# Patient Record
Sex: FEMALE | Race: WHITE | NOT HISPANIC OR LATINO | Employment: FULL TIME | URBAN - NONMETROPOLITAN AREA
[De-identification: names, ages, dates, MRNs, and addresses within clinical notes are randomized per-mention and may not be internally consistent; named-entity substitution may affect disease eponyms.]

---
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
## 2020-03-16 ENCOUNTER — HOSPITAL ENCOUNTER (OUTPATIENT)
Dept: GENERAL RADIOLOGY | Facility: HOSPITAL | Age: 20
Discharge: HOME | End: 2020-03-16
Admitting: NURSE PRACTITIONER

## 2020-03-16 ENCOUNTER — TRANSCRIBE ORDERS (OUTPATIENT)
Dept: GENERAL RADIOLOGY | Facility: HOSPITAL | Age: 20
End: 2020-03-16

## 2020-03-16 DIAGNOSIS — M25.561 RIGHT KNEE PAIN, UNSPECIFIED CHRONICITY: Primary | ICD-10-CM

## 2020-03-16 DIAGNOSIS — M25.561 RIGHT KNEE PAIN, UNSPECIFIED CHRONICITY: ICD-10-CM

## 2020-03-16 PROCEDURE — 73562 X-RAY EXAM OF KNEE 3: CPT

## 2020-07-14 ENCOUNTER — TRANSCRIBE ORDERS (OUTPATIENT)
Dept: ADMINISTRATIVE | Facility: HOSPITAL | Age: 20
End: 2020-07-14

## 2020-07-14 DIAGNOSIS — R10.11 ABDOMINAL PAIN, RIGHT UPPER QUADRANT: Primary | ICD-10-CM

## 2020-07-15 ENCOUNTER — HOSPITAL ENCOUNTER (OUTPATIENT)
Dept: ULTRASOUND IMAGING | Facility: HOSPITAL | Age: 20
Discharge: HOME | End: 2020-07-15
Admitting: NURSE PRACTITIONER

## 2020-07-15 DIAGNOSIS — R10.11 ABDOMINAL PAIN, RIGHT UPPER QUADRANT: ICD-10-CM

## 2020-07-15 PROCEDURE — 76705 ECHO EXAM OF ABDOMEN: CPT

## 2020-07-17 ENCOUNTER — TRANSCRIBE ORDERS (OUTPATIENT)
Dept: ADMINISTRATIVE | Facility: HOSPITAL | Age: 20
End: 2020-07-17

## 2020-07-17 DIAGNOSIS — R10.9 ABDOMINAL PAIN, UNSPECIFIED ABDOMINAL LOCATION: Primary | ICD-10-CM

## 2020-07-21 ENCOUNTER — APPOINTMENT (OUTPATIENT)
Dept: NUCLEAR MEDICINE | Facility: HOSPITAL | Age: 20
End: 2020-07-21

## 2020-07-22 ENCOUNTER — HOSPITAL ENCOUNTER (OUTPATIENT)
Dept: NUCLEAR MEDICINE | Facility: HOSPITAL | Age: 20
Discharge: HOME | End: 2020-07-22

## 2020-07-22 DIAGNOSIS — R10.9 ABDOMINAL PAIN, UNSPECIFIED ABDOMINAL LOCATION: ICD-10-CM

## 2020-07-22 PROCEDURE — A9537 TC99M MEBROFENIN: HCPCS | Performed by: NURSE PRACTITIONER

## 2020-07-22 PROCEDURE — 78226 HEPATOBILIARY SYSTEM IMAGING: CPT

## 2020-07-22 PROCEDURE — 0 TECHNETIUM TC 99M MEBROFENIN KIT: Performed by: NURSE PRACTITIONER

## 2020-07-22 RX ORDER — KIT FOR THE PREPARATION OF TECHNETIUM TC 99M MEBROFENIN 45 MG/10ML
1 INJECTION, POWDER, LYOPHILIZED, FOR SOLUTION INTRAVENOUS
Status: COMPLETED | OUTPATIENT
Start: 2020-07-22 | End: 2020-07-22

## 2020-07-22 RX ADMIN — MEBROFENIN 1 DOSE: 45 INJECTION, POWDER, LYOPHILIZED, FOR SOLUTION INTRAVENOUS at 08:08

## 2020-07-28 ENCOUNTER — TRANSCRIBE ORDERS (OUTPATIENT)
Dept: ADMINISTRATIVE | Facility: HOSPITAL | Age: 20
End: 2020-07-28

## 2020-07-28 DIAGNOSIS — Z01.818 PRE-OP TESTING: Primary | ICD-10-CM

## 2020-07-30 ENCOUNTER — APPOINTMENT (OUTPATIENT)
Dept: PREADMISSION TESTING | Facility: HOSPITAL | Age: 20
End: 2020-07-30

## 2020-07-30 VITALS
HEIGHT: 68 IN | BODY MASS INDEX: 33.88 KG/M2 | DIASTOLIC BLOOD PRESSURE: 77 MMHG | WEIGHT: 223.55 LBS | HEART RATE: 105 BPM | OXYGEN SATURATION: 99 % | SYSTOLIC BLOOD PRESSURE: 135 MMHG

## 2020-07-30 LAB
ALBUMIN SERPL-MCNC: 4.5 G/DL (ref 3.5–5.2)
ALBUMIN/GLOB SERPL: 1.7 G/DL
ALP SERPL-CCNC: 46 U/L (ref 39–117)
ALT SERPL W P-5'-P-CCNC: 12 U/L (ref 1–33)
AMYLASE SERPL-CCNC: 62 U/L (ref 28–100)
ANION GAP SERPL CALCULATED.3IONS-SCNC: 13 MMOL/L (ref 5–15)
AST SERPL-CCNC: 20 U/L (ref 1–32)
BASOPHILS # BLD AUTO: 0.02 10*3/MM3 (ref 0–0.2)
BASOPHILS NFR BLD AUTO: 0.3 % (ref 0–1.5)
BILIRUB SERPL-MCNC: 0.2 MG/DL (ref 0–1.2)
BUN SERPL-MCNC: 10 MG/DL (ref 6–20)
BUN/CREAT SERPL: 17.9 (ref 7–25)
CALCIUM SPEC-SCNC: 9.7 MG/DL (ref 8.6–10.5)
CHLORIDE SERPL-SCNC: 99 MMOL/L (ref 98–107)
CO2 SERPL-SCNC: 26 MMOL/L (ref 22–29)
CREAT SERPL-MCNC: 0.56 MG/DL (ref 0.57–1)
DEPRECATED RDW RBC AUTO: 36.1 FL (ref 37–54)
EOSINOPHIL # BLD AUTO: 0.05 10*3/MM3 (ref 0–0.4)
EOSINOPHIL NFR BLD AUTO: 0.6 % (ref 0.3–6.2)
ERYTHROCYTE [DISTWIDTH] IN BLOOD BY AUTOMATED COUNT: 12 % (ref 12.3–15.4)
GFR SERPL CREATININE-BSD FRML MDRD: 137 ML/MIN/1.73
GLOBULIN UR ELPH-MCNC: 2.7 GM/DL
GLUCOSE SERPL-MCNC: 114 MG/DL (ref 65–99)
HCT VFR BLD AUTO: 39.5 % (ref 34–46.6)
HGB BLD-MCNC: 13.8 G/DL (ref 12–15.9)
IMM GRANULOCYTES # BLD AUTO: 0.01 10*3/MM3 (ref 0–0.05)
IMM GRANULOCYTES NFR BLD AUTO: 0.1 % (ref 0–0.5)
LIPASE SERPL-CCNC: 34 U/L (ref 13–60)
LYMPHOCYTES # BLD AUTO: 3.13 10*3/MM3 (ref 0.7–3.1)
LYMPHOCYTES NFR BLD AUTO: 39.6 % (ref 19.6–45.3)
MCH RBC QN AUTO: 28.8 PG (ref 26.6–33)
MCHC RBC AUTO-ENTMCNC: 34.9 G/DL (ref 31.5–35.7)
MCV RBC AUTO: 82.3 FL (ref 79–97)
MONOCYTES # BLD AUTO: 0.57 10*3/MM3 (ref 0.1–0.9)
MONOCYTES NFR BLD AUTO: 7.2 % (ref 5–12)
NEUTROPHILS NFR BLD AUTO: 4.12 10*3/MM3 (ref 1.7–7)
NEUTROPHILS NFR BLD AUTO: 52.2 % (ref 42.7–76)
NRBC BLD AUTO-RTO: 0 /100 WBC (ref 0–0.2)
PLATELET # BLD AUTO: 309 10*3/MM3 (ref 140–450)
PMV BLD AUTO: 11.1 FL (ref 6–12)
POTASSIUM SERPL-SCNC: 3.5 MMOL/L (ref 3.5–5.2)
PROT SERPL-MCNC: 7.2 G/DL (ref 6–8.5)
RBC # BLD AUTO: 4.8 10*6/MM3 (ref 3.77–5.28)
SODIUM SERPL-SCNC: 138 MMOL/L (ref 136–145)
WBC # BLD AUTO: 7.9 10*3/MM3 (ref 3.4–10.8)

## 2020-07-30 PROCEDURE — 36415 COLL VENOUS BLD VENIPUNCTURE: CPT

## 2020-07-30 PROCEDURE — 83690 ASSAY OF LIPASE: CPT | Performed by: SPECIALIST

## 2020-07-30 PROCEDURE — 82150 ASSAY OF AMYLASE: CPT | Performed by: SPECIALIST

## 2020-07-30 PROCEDURE — 85025 COMPLETE CBC W/AUTO DIFF WBC: CPT | Performed by: SPECIALIST

## 2020-07-30 PROCEDURE — 80053 COMPREHEN METABOLIC PANEL: CPT | Performed by: SPECIALIST

## 2020-07-30 PROCEDURE — 93005 ELECTROCARDIOGRAM TRACING: CPT

## 2020-07-30 PROCEDURE — 93010 ELECTROCARDIOGRAM REPORT: CPT | Performed by: INTERNAL MEDICINE

## 2020-07-30 RX ORDER — LISINOPRIL AND HYDROCHLOROTHIAZIDE 20; 12.5 MG/1; MG/1
1 TABLET ORAL DAILY
COMMUNITY
End: 2022-10-17

## 2020-07-31 ENCOUNTER — LAB (OUTPATIENT)
Dept: LAB | Facility: HOSPITAL | Age: 20
End: 2020-07-31

## 2020-07-31 PROCEDURE — C9803 HOPD COVID-19 SPEC COLLECT: HCPCS | Performed by: SPECIALIST

## 2020-07-31 PROCEDURE — U0003 INFECTIOUS AGENT DETECTION BY NUCLEIC ACID (DNA OR RNA); SEVERE ACUTE RESPIRATORY SYNDROME CORONAVIRUS 2 (SARS-COV-2) (CORONAVIRUS DISEASE [COVID-19]), AMPLIFIED PROBE TECHNIQUE, MAKING USE OF HIGH THROUGHPUT TECHNOLOGIES AS DESCRIBED BY CMS-2020-01-R: HCPCS | Performed by: SPECIALIST

## 2020-08-01 LAB
COVID LABCORP PRIORITY: NORMAL
SARS-COV-2 RNA RESP QL NAA+PROBE: NOT DETECTED

## 2020-08-03 ENCOUNTER — ANESTHESIA EVENT (OUTPATIENT)
Dept: PERIOP | Facility: HOSPITAL | Age: 20
End: 2020-08-03

## 2020-08-03 ENCOUNTER — ANESTHESIA (OUTPATIENT)
Dept: PERIOP | Facility: HOSPITAL | Age: 20
End: 2020-08-03

## 2020-08-03 ENCOUNTER — HOSPITAL ENCOUNTER (OUTPATIENT)
Facility: HOSPITAL | Age: 20
Setting detail: HOSPITAL OUTPATIENT SURGERY
Discharge: HOME | End: 2020-08-03
Attending: SPECIALIST | Admitting: SPECIALIST

## 2020-08-03 VITALS
HEART RATE: 97 BPM | OXYGEN SATURATION: 98 % | RESPIRATION RATE: 18 BRPM | TEMPERATURE: 97.8 F | DIASTOLIC BLOOD PRESSURE: 91 MMHG | SYSTOLIC BLOOD PRESSURE: 134 MMHG

## 2020-08-03 DIAGNOSIS — K81.1 CHOLECYSTITIS, CHRONIC: ICD-10-CM

## 2020-08-03 LAB — B-HCG UR QL: NEGATIVE

## 2020-08-03 PROCEDURE — 81025 URINE PREGNANCY TEST: CPT | Performed by: SPECIALIST

## 2020-08-03 PROCEDURE — 25010000002 ONDANSETRON PER 1 MG: Performed by: NURSE ANESTHETIST, CERTIFIED REGISTERED

## 2020-08-03 PROCEDURE — 25010000002 SUCCINYLCHOLINE PER 20 MG: Performed by: NURSE ANESTHETIST, CERTIFIED REGISTERED

## 2020-08-03 PROCEDURE — 25010000002 FENTANYL CITRATE (PF) 100 MCG/2ML SOLUTION: Performed by: ANESTHESIOLOGY

## 2020-08-03 PROCEDURE — 25010000002 DEXAMETHASONE PER 1 MG: Performed by: ANESTHESIOLOGY

## 2020-08-03 PROCEDURE — 25010000002 MIDAZOLAM PER 1 MG: Performed by: ANESTHESIOLOGY

## 2020-08-03 PROCEDURE — 88304 TISSUE EXAM BY PATHOLOGIST: CPT | Performed by: SPECIALIST

## 2020-08-03 PROCEDURE — 25010000002 DEXAMETHASONE PER 1 MG: Performed by: NURSE ANESTHETIST, CERTIFIED REGISTERED

## 2020-08-03 PROCEDURE — 25010000002 PROPOFOL 10 MG/ML EMULSION: Performed by: NURSE ANESTHETIST, CERTIFIED REGISTERED

## 2020-08-03 RX ORDER — SODIUM CHLORIDE 0.9 % (FLUSH) 0.9 %
3-10 SYRINGE (ML) INJECTION AS NEEDED
Status: DISCONTINUED | OUTPATIENT
Start: 2020-08-03 | End: 2020-08-03 | Stop reason: HOSPADM

## 2020-08-03 RX ORDER — LIDOCAINE HYDROCHLORIDE 10 MG/ML
0.5 INJECTION, SOLUTION EPIDURAL; INFILTRATION; INTRACAUDAL; PERINEURAL ONCE AS NEEDED
Status: DISCONTINUED | OUTPATIENT
Start: 2020-08-03 | End: 2020-08-03 | Stop reason: HOSPADM

## 2020-08-03 RX ORDER — FLUMAZENIL 0.1 MG/ML
0.2 INJECTION INTRAVENOUS AS NEEDED
Status: DISCONTINUED | OUTPATIENT
Start: 2020-08-03 | End: 2020-08-03 | Stop reason: HOSPADM

## 2020-08-03 RX ORDER — ROCURONIUM BROMIDE 10 MG/ML
INJECTION, SOLUTION INTRAVENOUS AS NEEDED
Status: DISCONTINUED | OUTPATIENT
Start: 2020-08-03 | End: 2020-08-03 | Stop reason: SURG

## 2020-08-03 RX ORDER — SODIUM CHLORIDE, SODIUM LACTATE, POTASSIUM CHLORIDE, CALCIUM CHLORIDE 600; 310; 30; 20 MG/100ML; MG/100ML; MG/100ML; MG/100ML
100 INJECTION, SOLUTION INTRAVENOUS CONTINUOUS
Status: DISCONTINUED | OUTPATIENT
Start: 2020-08-03 | End: 2020-08-03 | Stop reason: HOSPADM

## 2020-08-03 RX ORDER — MIDAZOLAM HYDROCHLORIDE 1 MG/ML
2 INJECTION INTRAMUSCULAR; INTRAVENOUS
Status: DISCONTINUED | OUTPATIENT
Start: 2020-08-03 | End: 2020-08-03 | Stop reason: HOSPADM

## 2020-08-03 RX ORDER — OXYCODONE AND ACETAMINOPHEN 10; 325 MG/1; MG/1
1 TABLET ORAL ONCE AS NEEDED
Status: DISCONTINUED | OUTPATIENT
Start: 2020-08-03 | End: 2020-08-03 | Stop reason: HOSPADM

## 2020-08-03 RX ORDER — ACETAMINOPHEN 500 MG
1000 TABLET ORAL ONCE
Status: COMPLETED | OUTPATIENT
Start: 2020-08-03 | End: 2020-08-03

## 2020-08-03 RX ORDER — SODIUM CHLORIDE 0.9 % (FLUSH) 0.9 %
3 SYRINGE (ML) INJECTION EVERY 12 HOURS SCHEDULED
Status: DISCONTINUED | OUTPATIENT
Start: 2020-08-03 | End: 2020-08-03 | Stop reason: HOSPADM

## 2020-08-03 RX ORDER — IBUPROFEN 600 MG/1
600 TABLET ORAL ONCE AS NEEDED
Status: DISCONTINUED | OUTPATIENT
Start: 2020-08-03 | End: 2020-08-03 | Stop reason: HOSPADM

## 2020-08-03 RX ORDER — SUFENTANIL CITRATE 50 UG/ML
INJECTION EPIDURAL; INTRAVENOUS AS NEEDED
Status: DISCONTINUED | OUTPATIENT
Start: 2020-08-03 | End: 2020-08-03 | Stop reason: SURG

## 2020-08-03 RX ORDER — HYDROCODONE BITARTRATE AND ACETAMINOPHEN 7.5; 325 MG/1; MG/1
1-2 TABLET ORAL EVERY 4 HOURS PRN
Qty: 30 TABLET | Refills: 0 | Status: SHIPPED | OUTPATIENT
Start: 2020-08-03 | End: 2022-10-17

## 2020-08-03 RX ORDER — PROPOFOL 10 MG/ML
VIAL (ML) INTRAVENOUS AS NEEDED
Status: DISCONTINUED | OUTPATIENT
Start: 2020-08-03 | End: 2020-08-03 | Stop reason: SURG

## 2020-08-03 RX ORDER — LIDOCAINE HYDROCHLORIDE 40 MG/ML
SOLUTION TOPICAL AS NEEDED
Status: DISCONTINUED | OUTPATIENT
Start: 2020-08-03 | End: 2020-08-03 | Stop reason: SURG

## 2020-08-03 RX ORDER — ONDANSETRON 2 MG/ML
INJECTION INTRAMUSCULAR; INTRAVENOUS AS NEEDED
Status: DISCONTINUED | OUTPATIENT
Start: 2020-08-03 | End: 2020-08-03 | Stop reason: SURG

## 2020-08-03 RX ORDER — SODIUM CHLORIDE 9 MG/ML
INJECTION, SOLUTION INTRAVENOUS AS NEEDED
Status: DISCONTINUED | OUTPATIENT
Start: 2020-08-03 | End: 2020-08-03 | Stop reason: HOSPADM

## 2020-08-03 RX ORDER — DEXAMETHASONE SODIUM PHOSPHATE 4 MG/ML
INJECTION, SOLUTION INTRA-ARTICULAR; INTRALESIONAL; INTRAMUSCULAR; INTRAVENOUS; SOFT TISSUE AS NEEDED
Status: DISCONTINUED | OUTPATIENT
Start: 2020-08-03 | End: 2020-08-03 | Stop reason: SURG

## 2020-08-03 RX ORDER — SODIUM CHLORIDE, SODIUM LACTATE, POTASSIUM CHLORIDE, CALCIUM CHLORIDE 600; 310; 30; 20 MG/100ML; MG/100ML; MG/100ML; MG/100ML
1000 INJECTION, SOLUTION INTRAVENOUS CONTINUOUS
Status: DISCONTINUED | OUTPATIENT
Start: 2020-08-03 | End: 2020-08-03 | Stop reason: HOSPADM

## 2020-08-03 RX ORDER — SODIUM CHLORIDE 0.9 % (FLUSH) 0.9 %
3 SYRINGE (ML) INJECTION AS NEEDED
Status: DISCONTINUED | OUTPATIENT
Start: 2020-08-03 | End: 2020-08-03 | Stop reason: HOSPADM

## 2020-08-03 RX ORDER — NALOXONE HCL 0.4 MG/ML
0.4 VIAL (ML) INJECTION AS NEEDED
Status: DISCONTINUED | OUTPATIENT
Start: 2020-08-03 | End: 2020-08-03 | Stop reason: HOSPADM

## 2020-08-03 RX ORDER — LABETALOL HYDROCHLORIDE 5 MG/ML
5 INJECTION, SOLUTION INTRAVENOUS
Status: DISCONTINUED | OUTPATIENT
Start: 2020-08-03 | End: 2020-08-03 | Stop reason: HOSPADM

## 2020-08-03 RX ORDER — ONDANSETRON 2 MG/ML
4 INJECTION INTRAMUSCULAR; INTRAVENOUS ONCE AS NEEDED
Status: DISCONTINUED | OUTPATIENT
Start: 2020-08-03 | End: 2020-08-03 | Stop reason: HOSPADM

## 2020-08-03 RX ORDER — SUCCINYLCHOLINE CHLORIDE 20 MG/ML
INJECTION INTRAMUSCULAR; INTRAVENOUS AS NEEDED
Status: DISCONTINUED | OUTPATIENT
Start: 2020-08-03 | End: 2020-08-03 | Stop reason: SURG

## 2020-08-03 RX ORDER — DEXAMETHASONE SODIUM PHOSPHATE 4 MG/ML
4 INJECTION, SOLUTION INTRA-ARTICULAR; INTRALESIONAL; INTRAMUSCULAR; INTRAVENOUS; SOFT TISSUE ONCE AS NEEDED
Status: COMPLETED | OUTPATIENT
Start: 2020-08-03 | End: 2020-08-03

## 2020-08-03 RX ORDER — FENTANYL CITRATE 50 UG/ML
25 INJECTION, SOLUTION INTRAMUSCULAR; INTRAVENOUS
Status: DISCONTINUED | OUTPATIENT
Start: 2020-08-03 | End: 2020-08-03 | Stop reason: HOSPADM

## 2020-08-03 RX ORDER — OXYCODONE AND ACETAMINOPHEN 7.5; 325 MG/1; MG/1
2 TABLET ORAL EVERY 4 HOURS PRN
Status: DISCONTINUED | OUTPATIENT
Start: 2020-08-03 | End: 2020-08-03 | Stop reason: HOSPADM

## 2020-08-03 RX ORDER — NEOSTIGMINE METHYLSULFATE 5 MG/5 ML
SYRINGE (ML) INTRAVENOUS AS NEEDED
Status: DISCONTINUED | OUTPATIENT
Start: 2020-08-03 | End: 2020-08-03 | Stop reason: SURG

## 2020-08-03 RX ORDER — MAGNESIUM HYDROXIDE 1200 MG/15ML
LIQUID ORAL AS NEEDED
Status: DISCONTINUED | OUTPATIENT
Start: 2020-08-03 | End: 2020-08-03 | Stop reason: HOSPADM

## 2020-08-03 RX ORDER — BUPIVACAINE HYDROCHLORIDE AND EPINEPHRINE 5; 5 MG/ML; UG/ML
INJECTION, SOLUTION PERINEURAL AS NEEDED
Status: DISCONTINUED | OUTPATIENT
Start: 2020-08-03 | End: 2020-08-03 | Stop reason: HOSPADM

## 2020-08-03 RX ORDER — SCOLOPAMINE TRANSDERMAL SYSTEM 1 MG/1
1 PATCH, EXTENDED RELEASE TRANSDERMAL CONTINUOUS
Status: DISCONTINUED | OUTPATIENT
Start: 2020-08-03 | End: 2020-08-03 | Stop reason: HOSPADM

## 2020-08-03 RX ADMIN — FENTANYL CITRATE 25 MCG: 50 INJECTION, SOLUTION INTRAMUSCULAR; INTRAVENOUS at 09:31

## 2020-08-03 RX ADMIN — SUCCINYLCHOLINE CHLORIDE 140 MG: 20 INJECTION, SOLUTION INTRAMUSCULAR; INTRAVENOUS at 08:25

## 2020-08-03 RX ADMIN — SODIUM CHLORIDE, POTASSIUM CHLORIDE, SODIUM LACTATE AND CALCIUM CHLORIDE 100 ML/HR: 600; 310; 30; 20 INJECTION, SOLUTION INTRAVENOUS at 09:51

## 2020-08-03 RX ADMIN — LIDOCAINE HYDROCHLORIDE 100 MG: 20 INJECTION, SOLUTION INTRAVENOUS at 08:25

## 2020-08-03 RX ADMIN — MIDAZOLAM HYDROCHLORIDE 2 MG: 2 INJECTION, SOLUTION INTRAMUSCULAR; INTRAVENOUS at 08:05

## 2020-08-03 RX ADMIN — DEXAMETHASONE SODIUM PHOSPHATE 4 MG: 4 INJECTION, SOLUTION INTRAMUSCULAR; INTRAVENOUS at 08:05

## 2020-08-03 RX ADMIN — SODIUM CHLORIDE, POTASSIUM CHLORIDE, SODIUM LACTATE AND CALCIUM CHLORIDE 1000 ML: 600; 310; 30; 20 INJECTION, SOLUTION INTRAVENOUS at 07:37

## 2020-08-03 RX ADMIN — DEXAMETHASONE SODIUM PHOSPHATE 4 MG: 4 INJECTION, SOLUTION INTRAMUSCULAR; INTRAVENOUS at 08:57

## 2020-08-03 RX ADMIN — ACETAMINOPHEN 1000 MG: 500 TABLET, FILM COATED ORAL at 08:05

## 2020-08-03 RX ADMIN — LIDOCAINE HYDROCHLORIDE 1 EACH: 40 SOLUTION TOPICAL at 08:25

## 2020-08-03 RX ADMIN — SUFENTANIL CITRATE 10 MCG: 50 INJECTION EPIDURAL; INTRAVENOUS at 09:08

## 2020-08-03 RX ADMIN — PROPOFOL 150 MG: 10 INJECTION, EMULSION INTRAVENOUS at 08:25

## 2020-08-03 RX ADMIN — OXYCODONE HYDROCHLORIDE AND ACETAMINOPHEN 2 TABLET: 7.5; 325 TABLET ORAL at 09:40

## 2020-08-03 RX ADMIN — SUFENTANIL CITRATE 5 MCG: 50 INJECTION EPIDURAL; INTRAVENOUS at 08:52

## 2020-08-03 RX ADMIN — ROCURONIUM BROMIDE 30 MG: 10 INJECTION INTRAVENOUS at 08:25

## 2020-08-03 RX ADMIN — GLYCOPYRROLATE 0.4 MG: 0.2 INJECTION, SOLUTION INTRAMUSCULAR; INTRAVENOUS at 09:08

## 2020-08-03 RX ADMIN — FENTANYL CITRATE 25 MCG: 50 INJECTION, SOLUTION INTRAMUSCULAR; INTRAVENOUS at 09:36

## 2020-08-03 RX ADMIN — Medication 4 MG: at 09:08

## 2020-08-03 RX ADMIN — CEFAZOLIN 1 G: 1 INJECTION, POWDER, FOR SOLUTION INTRAMUSCULAR; INTRAVENOUS; PARENTERAL at 08:30

## 2020-08-03 RX ADMIN — SCOPALAMINE 1 PATCH: 1 PATCH, EXTENDED RELEASE TRANSDERMAL at 08:06

## 2020-08-03 RX ADMIN — FENTANYL CITRATE 25 MCG: 50 INJECTION, SOLUTION INTRAMUSCULAR; INTRAVENOUS at 09:41

## 2020-08-03 RX ADMIN — ONDANSETRON HYDROCHLORIDE 4 MG: 2 SOLUTION INTRAMUSCULAR; INTRAVENOUS at 08:57

## 2020-08-03 RX ADMIN — SUFENTANIL CITRATE 35 MCG: 50 INJECTION EPIDURAL; INTRAVENOUS at 08:25

## 2020-08-03 NOTE — OP NOTE
CHOLECYSTECTOMY LAPAROSCOPIC INTRAOPERATIVE CHOLANGIOGRAM  Procedure Note    Janiya Le  8/3/2020    Pre-op Diagnosis:   BILIARY DYSKINESIA    Post-op Diagnosis:     same    Procedure/CPT® Codes:  Laparoscopic cholecystectomy    Procedure(s):  CHOLECYSTECTOMY LAPAROSCOPIC INTRAOPERATIVE CHOLANGIOGRAM    Surgeon(s):  Nga Collier MD    Anesthesia: General    Staff:   Circulator: Belgica Mitchell RN; Brooks Almanza RN  Scrub Person: Paris Osorio; Sharmaine Pinon    Estimated Blood Loss:minimal    Specimens:                Specimens     ID Source Type Tests Collected By Collected At Frozen?      A Gallbladder Tissue · TISSUE PATHOLOGY EXAM   Nga Collier MD 8/3/20 0843 No     Description: Gallbladder and Contents            Drains: * No LDAs found *    Findings: normal appearing GB    Complications: none          Date: 8/3/2020  Time: 09:06        The patient was brought to the operating room and placed in supine position. After induction of anesthesia and infusion of antibiotics, the patient was prepped and draped in the usual sterile fashion. Versed needle technique was used. Standard 4 ports were placed. The gallbladder was grasped and retracted superiorly. The infundibulum was grasped and retracted laterally.   The cystic duct and cystic artery were identified, isolated, divided between clips. Endoloop placed.The gallbladder was removed from its bed using electrocautery, placed in Endo bag and removed through the epigastric port. Irrigation was done until all clear. All ports were removed. Fascia at the 10 mm port site was closed with Vicryl. The skin was reapproximated with #4-0 subcuticular. Then 0.5% Marcaine injected for local anesthesia. Dressing was placed. The patient was awakened and transferred to the recovery room in stable condition. He tolerated the procedure well. At the end of the procedure, all counts were correct.    Nga Collier MD

## 2020-08-03 NOTE — ANESTHESIA POSTPROCEDURE EVALUATION
Chronic medical condition.    Continuing home medication.  Monitoring.       Patient: Janiya Le    Procedure Summary     Date:  08/03/20 Room / Location:   [facility]  /  [facility]    Anesthesia Start:  0823 Anesthesia Stop:  0916    Procedure:  CHOLECYSTECTOMY LAPAROSCOPIC INTRAOPERATIVE CHOLANGIOGRAM (N/A Abdomen) Diagnosis:  (BILIARY DYSKINESIA)    Surgeon:  Nga Collier MD Provider:  Blade Dick CRNA    Anesthesia Type:  general ASA Status:  2          Anesthesia Type: general    Vitals  Vitals Value Taken Time   /80 8/3/2020 10:00 AM   Temp 97.8 °F (36.6 °C) 8/3/2020 10:00 AM   Pulse 79 8/3/2020 10:00 AM   Resp 10 8/3/2020 10:00 AM   SpO2 96 % 8/3/2020 10:00 AM           Post Anesthesia Care and Evaluation    Patient location during evaluation: PACU  Patient participation: complete - patient participated  Level of consciousness: awake and alert  Pain management: adequate  Airway patency: patent  Anesthetic complications: No anesthetic complications    Cardiovascular status: acceptable  Respiratory status: acceptable  Hydration status: acceptable    Comments: Blood pressure 146/80, pulse 79, temperature 97.8 °F (36.6 °C), temperature source Temporal, resp. rate 10, last menstrual period 07/12/2020, SpO2 96 %, not currently breastfeeding.    Pt discharged from PACU based on bernard score >8

## 2020-08-03 NOTE — ANESTHESIA PREPROCEDURE EVALUATION
Anesthesia Evaluation     Patient summary reviewed   history of anesthetic complications: PONV  NPO Solid Status: > 8 hours  NPO Liquid Status: > 8 hours           Airway   Mallampati: III  TM distance: >3 FB  Neck ROM: full  Dental - normal exam     Pulmonary - normal exam    breath sounds clear to auscultation  (-) asthma, recent URI, sleep apnea, not a smoker  Cardiovascular - normal exam  Exercise tolerance: good (4-7 METS)    Rhythm: regular  Rate: normal    (+) hypertension,   (-) pacemaker, past MI, angina, cardiac stents, CABG      Neuro/Psych  (-) seizures, TIA, CVA  GI/Hepatic/Renal/Endo    (+) obesity,     (-) GERD, liver disease, no renal disease, diabetes, no thyroid disorder    Musculoskeletal     Abdominal    Substance History      OB/GYN    (-)  Pregnant        Other                        Anesthesia Plan    ASA 2     general     intravenous induction     Anesthetic plan, all risks, benefits, and alternatives have been provided, discussed and informed consent has been obtained with: patient.

## 2020-08-03 NOTE — ANESTHESIA PROCEDURE NOTES
Airway  Urgency: elective    Date/Time: 8/3/2020 8:26 AM  Airway not difficult    General Information and Staff    Patient location during procedure: OR  CRNA: Blade Dick CRNA    Indications and Patient Condition  Indications for airway management: airway protection    Preoxygenated: yes  MILS maintained throughout  Mask difficulty assessment: 1 - vent by mask    Final Airway Details  Final airway type: endotracheal airway      Successful airway: ETT  Cuffed: yes   Successful intubation technique: direct laryngoscopy  Endotracheal tube insertion site: oral  Blade: Bneavidez  Blade size: 2  ETT size (mm): 7.0  Cormack-Lehane Classification: grade IIa - partial view of glottis  Placement verified by: chest auscultation   Cuff volume (mL): 10  Measured from: lips  ETT/EBT  to lips (cm): 21  Number of attempts at approach: 1  Assessment: lips, teeth, and gum same as pre-op and atraumatic intubation

## 2020-08-03 NOTE — DISCHARGE INSTRUCTIONS

## 2020-08-09 LAB
CYTO UR: NORMAL
LAB AP CASE REPORT: NORMAL
PATH REPORT.FINAL DX SPEC: NORMAL
PATH REPORT.GROSS SPEC: NORMAL

## 2022-09-28 ENCOUNTER — HOSPITAL ENCOUNTER (EMERGENCY)
Facility: HOSPITAL | Age: 22
Discharge: HOME | End: 2022-09-28
Admitting: FAMILY MEDICINE

## 2022-09-28 ENCOUNTER — APPOINTMENT (OUTPATIENT)
Dept: CT IMAGING | Facility: HOSPITAL | Age: 22
End: 2022-09-28

## 2022-09-28 VITALS
RESPIRATION RATE: 20 BRPM | HEART RATE: 98 BPM | HEIGHT: 68 IN | DIASTOLIC BLOOD PRESSURE: 76 MMHG | OXYGEN SATURATION: 99 % | WEIGHT: 234 LBS | SYSTOLIC BLOOD PRESSURE: 145 MMHG | BODY MASS INDEX: 35.46 KG/M2 | TEMPERATURE: 98.7 F

## 2022-09-28 DIAGNOSIS — N83.202 CYST OF LEFT OVARY: ICD-10-CM

## 2022-09-28 DIAGNOSIS — K52.9 ENTEROCOLITIS: Primary | ICD-10-CM

## 2022-09-28 DIAGNOSIS — E87.6 HYPOKALEMIA: ICD-10-CM

## 2022-09-28 LAB
ALBUMIN SERPL-MCNC: 4.5 G/DL (ref 3.5–5.2)
ALBUMIN/GLOB SERPL: 1.6 G/DL
ALP SERPL-CCNC: 52 U/L (ref 39–117)
ALT SERPL W P-5'-P-CCNC: 32 U/L (ref 1–33)
ANION GAP SERPL CALCULATED.3IONS-SCNC: 12 MMOL/L (ref 5–15)
AST SERPL-CCNC: 27 U/L (ref 1–32)
B-HCG UR QL: NEGATIVE
BACTERIA UR QL AUTO: ABNORMAL /HPF
BASOPHILS # BLD AUTO: 0.03 10*3/MM3 (ref 0–0.2)
BASOPHILS NFR BLD AUTO: 0.4 % (ref 0–1.5)
BILIRUB SERPL-MCNC: 0.3 MG/DL (ref 0–1.2)
BILIRUB UR QL STRIP: NEGATIVE
BUN SERPL-MCNC: 10 MG/DL (ref 6–20)
BUN/CREAT SERPL: 13.7 (ref 7–25)
CALCIUM SPEC-SCNC: 9.3 MG/DL (ref 8.6–10.5)
CHLORIDE SERPL-SCNC: 101 MMOL/L (ref 98–107)
CLARITY UR: CLEAR
CO2 SERPL-SCNC: 25 MMOL/L (ref 22–29)
COLOR UR: ABNORMAL
CREAT SERPL-MCNC: 0.73 MG/DL (ref 0.57–1)
DEPRECATED RDW RBC AUTO: 37.9 FL (ref 37–54)
EGFRCR SERPLBLD CKD-EPI 2021: 118.7 ML/MIN/1.73
EOSINOPHIL # BLD AUTO: 0.08 10*3/MM3 (ref 0–0.4)
EOSINOPHIL NFR BLD AUTO: 1.1 % (ref 0.3–6.2)
ERYTHROCYTE [DISTWIDTH] IN BLOOD BY AUTOMATED COUNT: 12.4 % (ref 12.3–15.4)
EXPIRATION DATE: NORMAL
GLOBULIN UR ELPH-MCNC: 2.8 GM/DL
GLUCOSE SERPL-MCNC: 101 MG/DL (ref 65–99)
GLUCOSE UR STRIP-MCNC: NEGATIVE MG/DL
HCT VFR BLD AUTO: 39.9 % (ref 34–46.6)
HGB BLD-MCNC: 13.6 G/DL (ref 12–15.9)
HGB UR QL STRIP.AUTO: ABNORMAL
HYALINE CASTS UR QL AUTO: ABNORMAL /LPF
IMM GRANULOCYTES # BLD AUTO: 0.01 10*3/MM3 (ref 0–0.05)
IMM GRANULOCYTES NFR BLD AUTO: 0.1 % (ref 0–0.5)
INTERNAL NEGATIVE CONTROL: NEGATIVE
INTERNAL POSITIVE CONTROL: POSITIVE
KETONES UR QL STRIP: ABNORMAL
LEUKOCYTE ESTERASE UR QL STRIP.AUTO: ABNORMAL
LIPASE SERPL-CCNC: 35 U/L (ref 13–60)
LYMPHOCYTES # BLD AUTO: 4.1 10*3/MM3 (ref 0.7–3.1)
LYMPHOCYTES NFR BLD AUTO: 54.9 % (ref 19.6–45.3)
Lab: NORMAL
MAGNESIUM SERPL-MCNC: 1.9 MG/DL (ref 1.6–2.6)
MCH RBC QN AUTO: 28.5 PG (ref 26.6–33)
MCHC RBC AUTO-ENTMCNC: 34.1 G/DL (ref 31.5–35.7)
MCV RBC AUTO: 83.6 FL (ref 79–97)
MONOCYTES # BLD AUTO: 0.61 10*3/MM3 (ref 0.1–0.9)
MONOCYTES NFR BLD AUTO: 8.2 % (ref 5–12)
NEUTROPHILS NFR BLD AUTO: 2.64 10*3/MM3 (ref 1.7–7)
NEUTROPHILS NFR BLD AUTO: 35.3 % (ref 42.7–76)
NITRITE UR QL STRIP: NEGATIVE
NRBC BLD AUTO-RTO: 0 /100 WBC (ref 0–0.2)
PH UR STRIP.AUTO: 6 [PH] (ref 5–8)
PLATELET # BLD AUTO: 271 10*3/MM3 (ref 140–450)
PMV BLD AUTO: 10.6 FL (ref 6–12)
POTASSIUM SERPL-SCNC: 3 MMOL/L (ref 3.5–5.2)
PROT SERPL-MCNC: 7.3 G/DL (ref 6–8.5)
PROT UR QL STRIP: ABNORMAL
RBC # BLD AUTO: 4.77 10*6/MM3 (ref 3.77–5.28)
RBC # UR STRIP: ABNORMAL /HPF
REF LAB TEST METHOD: ABNORMAL
SODIUM SERPL-SCNC: 138 MMOL/L (ref 136–145)
SP GR UR STRIP: 1.02 (ref 1–1.03)
SQUAMOUS #/AREA URNS HPF: ABNORMAL /HPF
UROBILINOGEN UR QL STRIP: ABNORMAL
WBC # UR STRIP: ABNORMAL /HPF
WBC NRBC COR # BLD: 7.47 10*3/MM3 (ref 3.4–10.8)

## 2022-09-28 PROCEDURE — 96375 TX/PRO/DX INJ NEW DRUG ADDON: CPT

## 2022-09-28 PROCEDURE — 74176 CT ABD & PELVIS W/O CONTRAST: CPT

## 2022-09-28 PROCEDURE — 85025 COMPLETE CBC W/AUTO DIFF WBC: CPT | Performed by: NURSE PRACTITIONER

## 2022-09-28 PROCEDURE — 80053 COMPREHEN METABOLIC PANEL: CPT | Performed by: NURSE PRACTITIONER

## 2022-09-28 PROCEDURE — 83690 ASSAY OF LIPASE: CPT | Performed by: NURSE PRACTITIONER

## 2022-09-28 PROCEDURE — 81025 URINE PREGNANCY TEST: CPT | Performed by: NURSE PRACTITIONER

## 2022-09-28 PROCEDURE — 25010000002 ONDANSETRON PER 1 MG: Performed by: NURSE PRACTITIONER

## 2022-09-28 PROCEDURE — 96374 THER/PROPH/DIAG INJ IV PUSH: CPT

## 2022-09-28 PROCEDURE — 83735 ASSAY OF MAGNESIUM: CPT | Performed by: NURSE PRACTITIONER

## 2022-09-28 PROCEDURE — 81001 URINALYSIS AUTO W/SCOPE: CPT | Performed by: NURSE PRACTITIONER

## 2022-09-28 PROCEDURE — 99283 EMERGENCY DEPT VISIT LOW MDM: CPT

## 2022-09-28 RX ORDER — CIPROFLOXACIN 500 MG/1
500 TABLET, FILM COATED ORAL EVERY 12 HOURS
Qty: 20 TABLET | Refills: 0 | Status: SHIPPED | OUTPATIENT
Start: 2022-09-28 | End: 2022-10-08

## 2022-09-28 RX ORDER — FAMOTIDINE 10 MG/ML
20 INJECTION, SOLUTION INTRAVENOUS ONCE
Status: COMPLETED | OUTPATIENT
Start: 2022-09-28 | End: 2022-09-28

## 2022-09-28 RX ORDER — ONDANSETRON 2 MG/ML
4 INJECTION INTRAMUSCULAR; INTRAVENOUS ONCE
Status: COMPLETED | OUTPATIENT
Start: 2022-09-28 | End: 2022-09-28

## 2022-09-28 RX ORDER — METRONIDAZOLE 500 MG/1
500 TABLET ORAL 2 TIMES DAILY
Qty: 20 TABLET | Refills: 0 | Status: SHIPPED | OUTPATIENT
Start: 2022-09-28 | End: 2022-10-08

## 2022-09-28 RX ORDER — HYDROXYZINE HYDROCHLORIDE 25 MG/1
25 TABLET, FILM COATED ORAL 3 TIMES DAILY PRN
COMMUNITY

## 2022-09-28 RX ORDER — ONDANSETRON 4 MG/1
4 TABLET, ORALLY DISINTEGRATING ORAL EVERY 6 HOURS PRN
Qty: 12 TABLET | Refills: 0 | Status: SHIPPED | OUTPATIENT
Start: 2022-09-28

## 2022-09-28 RX ORDER — POTASSIUM CHLORIDE 750 MG/1
10 TABLET, FILM COATED, EXTENDED RELEASE ORAL 2 TIMES DAILY
Qty: 6 TABLET | Refills: 0 | Status: SHIPPED | OUTPATIENT
Start: 2022-09-28 | End: 2022-10-01

## 2022-09-28 RX ADMIN — FAMOTIDINE 20 MG: 10 INJECTION INTRAVENOUS at 09:51

## 2022-09-28 RX ADMIN — ONDANSETRON 4 MG: 2 INJECTION INTRAMUSCULAR; INTRAVENOUS at 09:51

## 2022-09-28 RX ADMIN — SODIUM CHLORIDE, POTASSIUM CHLORIDE, SODIUM LACTATE AND CALCIUM CHLORIDE 1000 ML: 600; 310; 30; 20 INJECTION, SOLUTION INTRAVENOUS at 09:51

## 2022-10-17 ENCOUNTER — PATIENT ROUNDING (BHMG ONLY) (OUTPATIENT)
Dept: GASTROENTEROLOGY | Facility: CLINIC | Age: 22
End: 2022-10-17

## 2022-10-17 ENCOUNTER — OFFICE VISIT (OUTPATIENT)
Dept: GASTROENTEROLOGY | Facility: CLINIC | Age: 22
End: 2022-10-17

## 2022-10-17 VITALS
HEIGHT: 68 IN | HEART RATE: 102 BPM | TEMPERATURE: 97.3 F | DIASTOLIC BLOOD PRESSURE: 74 MMHG | WEIGHT: 236 LBS | SYSTOLIC BLOOD PRESSURE: 116 MMHG | OXYGEN SATURATION: 98 % | BODY MASS INDEX: 35.77 KG/M2

## 2022-10-17 DIAGNOSIS — Z83.71 FH: COLON POLYPS: ICD-10-CM

## 2022-10-17 DIAGNOSIS — R19.7 DIARRHEA, UNSPECIFIED TYPE: ICD-10-CM

## 2022-10-17 DIAGNOSIS — K21.9 GASTROESOPHAGEAL REFLUX DISEASE, UNSPECIFIED WHETHER ESOPHAGITIS PRESENT: ICD-10-CM

## 2022-10-17 DIAGNOSIS — K59.00 CONSTIPATION, UNSPECIFIED CONSTIPATION TYPE: ICD-10-CM

## 2022-10-17 DIAGNOSIS — K52.9 ENTEROCOLITIS: Primary | ICD-10-CM

## 2022-10-17 DIAGNOSIS — R11.2 NAUSEA AND VOMITING, UNSPECIFIED VOMITING TYPE: ICD-10-CM

## 2022-10-17 PROBLEM — Z83.719 FH: COLON POLYPS: Status: ACTIVE | Noted: 2022-10-17

## 2022-10-17 PROCEDURE — 99204 OFFICE O/P NEW MOD 45 MIN: CPT | Performed by: NURSE PRACTITIONER

## 2022-10-17 RX ORDER — POTASSIUM CHLORIDE 750 MG/1
1 TABLET, FILM COATED, EXTENDED RELEASE ORAL DAILY
COMMUNITY

## 2022-10-17 RX ORDER — SODIUM, POTASSIUM,MAG SULFATES 17.5-3.13G
1 SOLUTION, RECONSTITUTED, ORAL ORAL EVERY 12 HOURS
Qty: 354 ML | Refills: 0 | Status: SHIPPED | OUTPATIENT
Start: 2022-10-17 | End: 2022-11-04 | Stop reason: HOSPADM

## 2022-10-17 NOTE — PROGRESS NOTES
October 17, 2022    Hello, may I speak with Janiya Napier?    My name is       I am  with [facility]  [address], [address]  [place] [address] [phone].    Before we get started may I verify your date of birth? 2000    I am calling to officially welcome you to our practice and ask about your recent visit. Is this a good time to talk? yes    Tell me about your visit with us. What things went well?  She is glad we could get her endo/colon in quickly (10/21). All questions were answered. This is her first colonoscopy.       We're always looking for ways to make our patients' experiences even better. Do you have recommendations on ways we may improve?  no    Overall were you satisfied with your first visit to our practice? yes       I appreciate you taking the time to speak with me today. Is there anything else I can do for you? no      Thank you, and have a great day.

## 2022-10-17 NOTE — PROGRESS NOTES
"Primary Physician: Starla Mccray, JASON    Chief Complaint   Patient presents with   • Abdominal Pain     Pt c/o right-sided abdominal \"for years\" but seems to have gotten worse a couple of weeks ago-ended up in [facility] ER 9/28/22 due to the pain and vomiting-had CT and was treated with 2 different abx for enterocolitis   • Nausea     Pt also c/o occasional nausea-states \"it happens randomly\"-has zofran that does help-pt had an episode of vomiting for 4 days right before she ended up in the ER last month   • Constipation     Pt states she has chronic issues with constipation-uses OTC meds that help for a while but she feels like her system gets used to them and they quit working       Subjective     Janiya Napier is a 22 y.o. female.    HPI   Abdominal Pain/Nause/Vomitting with abnormal  CT Scan-  Patient recently presented to the emergency room on 9/29/2022 with complaints of right upper quadrant abdominal pain lasting for the previous 4 to 5 days.  It did radiate toward her umbilicus region.  She had some associated nausea vomiting and diarrhea.  Unable to keep anything down and felt very dehydrated which led her to the ER for treatment.  She was treated with Cipro and Flagyl for 10 days. This was not helpful according to the patient.  CT Abdomen/Pelvis 9/28/22 revealed possible enterocolitis with abnormal bowel gas pattern with mild fluid distention of the right and transverse colon as well as some more distal loops of ileum.  Labs 9/28/22 with normal WBC, potassium a bit low at 3.0    No Recent illness preceding the recent Ct or event above described.    Her normal bowel pattern is that she only has a BM about once every 2 weeks.  Currently taking Miralax once daily and Senna 3-4 tabs daily.  No blood in her stools.  No fever.  Has had appendix and gallbladder removed.  Mother had diverticulosis.  Maternal grandfather had colon cancer.  Mother had colon polyps in her 40's.  No crohn's or UC in her " family.   No NSAIDS.  She has complains of frequent reflux and burning, which she is using TUMS.  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  The heartburn will keep her at night from time to time.  No dysphagia.  Pt had did a trial of Prilosec for about 1 month and this did not help.      Past Medical History:   Diagnosis Date   • Anxiety    • Family history of colon cancer    • Family history of colonic polyps    • PONV (postoperative nausea and vomiting)        Past Surgical History:   Procedure Laterality Date   • APPENDECTOMY N/A 5/8/2018    Procedure: APPENDECTOMY LAPAROSCOPIC;  Surgeon: Nga Collier MD;  Location: [facility] OR;  Service: General   • CHOLECYSTECTOMY WITH INTRAOPERATIVE CHOLANGIOGRAM N/A 8/3/2020    Procedure: CHOLECYSTECTOMY LAPAROSCOPIC INTRAOPERATIVE CHOLANGIOGRAM;  Surgeon: Nga Collier MD;  Location: [facility] OR;  Service: General;  Laterality: N/A;   • COLPOSCOPY     • SHOULDER SURGERY Right     x2   • TONSILLECTOMY          Current Outpatient Medications:   •  hydrOXYzine (ATARAX) 25 MG tablet, Take 25 mg by mouth 3 (Three) Times a Day As Needed for Itching., Disp: , Rfl:   •  norgestimate-ethinyl estradiol (ORTHO-CYCLEN) 0.25-35 MG-MCG per tablet, Take 1 tablet by mouth Daily., Disp: , Rfl:   •  ondansetron ODT (ZOFRAN-ODT) 4 MG disintegrating tablet, Place 1 tablet on the tongue Every 6 (Six) Hours As Needed for Nausea., Disp: 12 tablet, Rfl: 0  •  potassium chloride 10 MEQ CR tablet, Take 1 tablet by mouth Daily., Disp: , Rfl:   •  sertraline (ZOLOFT) 50 MG tablet, Take 50 mg by mouth Daily., Disp: , Rfl:   •  sodium-potassium-magnesium sulfates (Suprep Bowel Prep Kit) 17.5-3.13-1.6 GM/177ML solution oral solution, Take 1 bottle by mouth Every 12 (Twelve) Hours. Take as directed split dose, Disp: 354 mL, Rfl: 0    No Known Allergies    Social History     Socioeconomic History   • Marital status:    Tobacco Use   • Smoking status: Never   • Smokeless tobacco: Never   Vaping Use   •  "Vaping Use: Never used   Substance and Sexual Activity   • Alcohol use: Not Currently   • Drug use: No   • Sexual activity: Defer       Family History   Problem Relation Age of Onset   • Colon polyps Mother         < 60 years of age   • Diabetes Father    • Hypertension Father    • Colon cancer Maternal Grandfather         In his 70's   • Esophageal cancer Neg Hx    • Liver cancer Neg Hx    • Liver disease Neg Hx    • Rectal cancer Neg Hx    • Stomach cancer Neg Hx        Review of Systems   Constitutional: Negative for fever and unexpected weight change.   Respiratory: Negative for shortness of breath.    Cardiovascular: Negative for chest pain.       Objective     /74 (BP Location: Left arm, Patient Position: Sitting, Cuff Size: Adult)   Pulse 102   Temp 97.3 °F (36.3 °C) (Infrared)   Ht 172.7 cm (68\")   Wt 107 kg (236 lb)   LMP 09/27/2022   SpO2 98%   Breastfeeding No   BMI 35.88 kg/m²     Physical Exam  Vitals reviewed.   Constitutional:       Appearance: Normal appearance.   Cardiovascular:      Rate and Rhythm: Normal rate and regular rhythm.   Pulmonary:      Effort: Pulmonary effort is normal.      Breath sounds: Normal breath sounds.   Abdominal:      General: Abdomen is flat. Bowel sounds are normal.      Palpations: Abdomen is soft.   Neurological:      Mental Status: She is alert.         Lab Results - Last 18 Months   Lab Units 09/28/22  0950   GLUCOSE mg/dL 101*   BUN mg/dL 10   CREATININE mg/dL 0.73   SODIUM mmol/L 138   POTASSIUM mmol/L 3.0*   CHLORIDE mmol/L 101   CO2 mmol/L 25.0   TOTAL PROTEIN g/dL 7.3   ALBUMIN g/dL 4.50   ALT (SGPT) U/L 32   AST (SGOT) U/L 27   ALK PHOS U/L 52   BILIRUBIN mg/dL 0.3   GLOBULIN gm/dL 2.8       Lab Results - Last 18 Months   Lab Units 09/28/22  0950   HEMOGLOBIN g/dL 13.6   HEMATOCRIT % 39.9   MCV fL 83.6   WBC 10*3/mm3 7.47   RDW % 12.4   MPV fL 10.6   PLATELETS 10*3/mm3 271       CT ABDOMEN PELVIS WO CONTRAST- 9/28/2022 10:50 AM CDT     HISTORY: " abdominal pain      COMPARISON: None      DLP: 606 mGy cm. All CT scans are performed using dose optimization  techniques as appropriate to the performed exam and including at least  one of the following: Automated exposure control, adjustment of the mA  and/or kV according to size, and the use of the iterative reconstruction  technique.     TECHNIQUE: Noncontrast enhanced images of the abdomen and pelvis  obtained without oral contrast.      FINDINGS:   The lung bases and base of the heart are unremarkable.      LIVER: No focal liver lesion.      BILIARY SYSTEM: The gallbladder is surgically absent. No biliary  dilatation is present..      PANCREAS: No focal pancreatic lesion.      SPLEEN: Unremarkable.      KIDNEYS AND ADRENALS: Bilateral kidneys and adrenal glands are  unremarkable.  The ureters are decompressed and normal in appearance.     RETROPERITONEUM: No mass, lymphadenopathy or hemorrhage.      GI TRACT: There is a abnormal bowel gas pattern with fluid-filled mild  distention of the right and transverse colon as well as the more distal  ileum suggesting an ongoing enterocolitis. There is no evidence of focal  bowel wall thickening or mechanical obstruction.. The appendix is  surgically absent..     OTHER: There is no mesenteric mass, lymphadenopathy or fluid collection.  The osseous structures and soft tissues demonstrate no worrisome  lesions. A small fat-containing periumbilical hernia is present.      PELVIS: A 4.4 x 4.5 cm left ovarian cyst is present. The uterus and  adnexa are otherwise unremarkable. There is no free fluid in the  cul-de-sac.. The urinary bladder is normal in appearance.     IMPRESSION:  1. Abnormal bowel gas pattern with mild fluid distention of the right  and transverse colon as well as of some more distal loops of ileum.  Scattered air-fluid levels are present suggesting an ongoing  enterocolitis. No evidence of obstruction. The appendix is surgically  absent.  2. 4.4 x 4.5 cm  left ovarian cyst. This would warrant follow-up with  repeat ultrasound in 7-10 weeks. Uterus and adnexa are otherwise  unremarkable.  3. No free fluid or localized inflammatory stranding. No evidence of  nephrolithiasis or obstructive uropathy.  4. The patient is status post cholecystectomy and appendectomy. A small  fat-containing periumbilical hernia is present..         This report was finalized on 09/28/2022 11:07 by Dr. Bonifacio Owens MD.      IMPRESSION/PLAN:    Assessment & Plan      Problem List Items Addressed This Visit        Family History    FH: colon polyps    Overview     Mother in her 40's and her paternal grandfather had colon cancer            Gastrointestinal Abdominal     Enterocolitis - Primary    Overview     Per CT Scan 9/28/22: Possible enterocolitis with abnormal bowel gas pattern with mild fluid distention of the right and transverse colon as well as more distal loops of ileum.  Her white blood cell count         Relevant Medications    sodium-potassium-magnesium sulfates (Suprep Bowel Prep Kit) 17.5-3.13-1.6 GM/177ML solution oral solution    Other Relevant Orders    Case Request (Completed)    Nausea & vomiting    Overview     Starting 9/24/22 associated with diarrhea led to the ER visit as already described.  Diarrhea has resolved nausea vomiting persist         Relevant Medications    sodium-potassium-magnesium sulfates (Suprep Bowel Prep Kit) 17.5-3.13-1.6 GM/177ML solution oral solution    Other Relevant Orders    Case Request (Completed)    Diarrhea    Overview     Started 9/24/22, treated with Cipro & Flagyl for 10 days.  Diarrhea was associated with nausea vomiting which led to ER visit 9/29/2022         Current Assessment & Plan     Resolved         Relevant Orders    Case Request (Completed)    GERD (gastroesophageal reflux disease)    Overview     Increased heartburn and reflux, despite taking TUMS daily         Constipation    Overview     We will only have a bowel movement  once every 2 weeks  Currently using MiraLAX once daily and senna 3-4 tabs daily         Relevant Medications    sodium-potassium-magnesium sulfates (Suprep Bowel Prep Kit) 17.5-3.13-1.6 GM/177ML solution oral solution    Other Relevant Orders    Case Request (Completed)     Endo/Colon per Dr Hester  Start taking Miralax at least once daily up to 3x daily as needed for constipation.  SHe can continue using senna as needed as well.  May need to increase MiraLAX even more but we will start with this amount.  Encouraged to increase water intake.          The risks, benefits, and alternatives of colonoscopy were reviewed with the patient today.  Risks including perforation of the colon possibly requiring surgery or colostomy.  Additional risks include risk of bleeding from biopsies or removal of colon tissue.  There is also the risk of a drug reaction or problems with anesthesia.  This will be discussed with the further by the anesthesia team on the day of the procedure.  Lastly there is a possibility of missing a colon polyp or cancer.  The benefits include the diagnosis and management of disease of the colon and rectum.  Alternatives to colonoscopy include barium enema, laboratory testing, radiographic evaluation, or no intervention.  The patient verbalizes understanding and agrees.    In accordance with requirements under the Affordable Care Act, [facility] has provided pricing for all hospital services and items on each of its websites. However, a patient's actual cost may differ based on the services the patient receives to meet individual healthcare needs and based on the benefits provided under the patient’s insurance coverage.        Berna Sawyer, APRN  10/18/22  16:15 CDT    Much of this encounter note is an electronic transcription/translation of spoken language to printed text. The electronic translation of spoken language may permit erroneous, or at times, nonsensical words or phrases to be  inadvertently transcribed; although I have reviewed the note for such errors, some may still exist.

## 2022-10-18 PROBLEM — K59.00 CONSTIPATION: Status: ACTIVE | Noted: 2022-10-18

## 2022-11-02 ENCOUNTER — TRANSCRIBE ORDERS (OUTPATIENT)
Dept: ADMINISTRATIVE | Facility: HOSPITAL | Age: 22
End: 2022-11-02

## 2022-11-02 DIAGNOSIS — N83.202 UNSPECIFIED OVARIAN CYST, LEFT SIDE: Primary | ICD-10-CM

## 2022-11-04 ENCOUNTER — ANESTHESIA EVENT (OUTPATIENT)
Dept: GASTROENTEROLOGY | Facility: HOSPITAL | Age: 22
End: 2022-11-04

## 2022-11-04 ENCOUNTER — HOSPITAL ENCOUNTER (OUTPATIENT)
Facility: HOSPITAL | Age: 22
Setting detail: HOSPITAL OUTPATIENT SURGERY
Discharge: HOME | End: 2022-11-04
Attending: INTERNAL MEDICINE | Admitting: INTERNAL MEDICINE

## 2022-11-04 ENCOUNTER — TELEPHONE (OUTPATIENT)
Dept: GASTROENTEROLOGY | Facility: CLINIC | Age: 22
End: 2022-11-04

## 2022-11-04 ENCOUNTER — ANESTHESIA (OUTPATIENT)
Dept: GASTROENTEROLOGY | Facility: HOSPITAL | Age: 22
End: 2022-11-04

## 2022-11-04 VITALS
SYSTOLIC BLOOD PRESSURE: 119 MMHG | RESPIRATION RATE: 20 BRPM | HEART RATE: 71 BPM | BODY MASS INDEX: 35.46 KG/M2 | OXYGEN SATURATION: 100 % | HEIGHT: 68 IN | TEMPERATURE: 96.9 F | DIASTOLIC BLOOD PRESSURE: 75 MMHG | WEIGHT: 234 LBS

## 2022-11-04 DIAGNOSIS — K52.9 ENTEROCOLITIS: ICD-10-CM

## 2022-11-04 DIAGNOSIS — R11.2 NAUSEA AND VOMITING, UNSPECIFIED VOMITING TYPE: ICD-10-CM

## 2022-11-04 DIAGNOSIS — R19.7 DIARRHEA, UNSPECIFIED TYPE: ICD-10-CM

## 2022-11-04 DIAGNOSIS — K59.00 CONSTIPATION, UNSPECIFIED CONSTIPATION TYPE: ICD-10-CM

## 2022-11-04 PROBLEM — K21.00 GASTROESOPHAGEAL REFLUX DISEASE WITH ESOPHAGITIS WITHOUT HEMORRHAGE: Status: ACTIVE | Noted: 2022-10-17

## 2022-11-04 LAB — B-HCG UR QL: NEGATIVE

## 2022-11-04 PROCEDURE — 43235 EGD DIAGNOSTIC BRUSH WASH: CPT | Performed by: INTERNAL MEDICINE

## 2022-11-04 PROCEDURE — 45378 DIAGNOSTIC COLONOSCOPY: CPT | Performed by: INTERNAL MEDICINE

## 2022-11-04 PROCEDURE — 81025 URINE PREGNANCY TEST: CPT | Performed by: ANESTHESIOLOGY

## 2022-11-04 PROCEDURE — 25010000002 PROPOFOL 10 MG/ML EMULSION: Performed by: NURSE ANESTHETIST, CERTIFIED REGISTERED

## 2022-11-04 RX ORDER — LIDOCAINE HYDROCHLORIDE 10 MG/ML
0.5 INJECTION, SOLUTION EPIDURAL; INFILTRATION; INTRACAUDAL; PERINEURAL ONCE AS NEEDED
Status: DISCONTINUED | OUTPATIENT
Start: 2022-11-04 | End: 2022-11-04 | Stop reason: HOSPADM

## 2022-11-04 RX ORDER — PROPOFOL 10 MG/ML
VIAL (ML) INTRAVENOUS AS NEEDED
Status: DISCONTINUED | OUTPATIENT
Start: 2022-11-04 | End: 2022-11-04 | Stop reason: SURG

## 2022-11-04 RX ORDER — PANTOPRAZOLE SODIUM 40 MG/1
40 TABLET, DELAYED RELEASE ORAL DAILY
Qty: 30 TABLET | Refills: 11 | Status: SHIPPED | OUTPATIENT
Start: 2022-11-04

## 2022-11-04 RX ORDER — SODIUM CHLORIDE 0.9 % (FLUSH) 0.9 %
10 SYRINGE (ML) INJECTION AS NEEDED
Status: DISCONTINUED | OUTPATIENT
Start: 2022-11-04 | End: 2022-11-04 | Stop reason: HOSPADM

## 2022-11-04 RX ORDER — SODIUM CHLORIDE 9 MG/ML
500 INJECTION, SOLUTION INTRAVENOUS CONTINUOUS PRN
Status: DISCONTINUED | OUTPATIENT
Start: 2022-11-04 | End: 2022-11-04 | Stop reason: HOSPADM

## 2022-11-04 RX ORDER — LIDOCAINE HYDROCHLORIDE 20 MG/ML
INJECTION, SOLUTION EPIDURAL; INFILTRATION; INTRACAUDAL; PERINEURAL AS NEEDED
Status: DISCONTINUED | OUTPATIENT
Start: 2022-11-04 | End: 2022-11-04 | Stop reason: SURG

## 2022-11-04 RX ADMIN — SODIUM CHLORIDE 500 ML: 9 INJECTION, SOLUTION INTRAVENOUS at 08:48

## 2022-11-04 RX ADMIN — PROPOFOL 750 MG: 10 INJECTION, EMULSION INTRAVENOUS at 10:19

## 2022-11-04 RX ADMIN — LIDOCAINE HYDROCHLORIDE 50 MG: 20 INJECTION, SOLUTION EPIDURAL; INFILTRATION; INTRACAUDAL; PERINEURAL at 10:20

## 2022-11-04 NOTE — ANESTHESIA PREPROCEDURE EVALUATION
Anesthesia Evaluation     Patient summary reviewed   history of anesthetic complications: PONV  NPO Solid Status: > 8 hours             Airway   Mallampati: I  TM distance: >3 FB  Dental          Pulmonary - negative pulmonary ROS   Cardiovascular   Exercise tolerance: excellent (>7 METS)    (+) hypertension (no meds),       Neuro/Psych  (+) psychiatric history Anxiety,    GI/Hepatic/Renal/Endo    (+) obesity,       Musculoskeletal     Abdominal    Substance History      OB/GYN          Other                        Anesthesia Plan    ASA 2     MAC       Anesthetic plan, risks, benefits, and alternatives have been provided, discussed and informed consent has been obtained with: patient.        CODE STATUS:

## 2022-11-04 NOTE — ANESTHESIA POSTPROCEDURE EVALUATION
Patient: Janiya Naiper    Procedure Summary     Date: 11/04/22 Room / Location: [facility] ENDOSCOPY 6 / [facility]    Anesthesia Start: 1018 Anesthesia Stop: 1043    Procedures:       ESOPHAGOGASTRODUODENOSCOPY WITH ANESTHESIA      COLONOSCOPY WITH ANESTHESIA Diagnosis:       Enterocolitis      Nausea and vomiting, unspecified vomiting type      Diarrhea, unspecified type      Constipation, unspecified constipation type      (Enterocolitis [K52.9])      (Nausea and vomiting, unspecified vomiting type [R11.2])      (Diarrhea, unspecified type [R19.7])      (Constipation, unspecified constipation type [K59.00])    Surgeons: Betsy Hester MD Provider: Giorgio Dodge CRNA    Anesthesia Type: MAC ASA Status: 2          Anesthesia Type: MAC    Vitals  Vitals Value Taken Time   BP     Temp     Pulse 83 11/04/22 1043   Resp     SpO2 97 % 11/04/22 1043   Vitals shown include unvalidated device data.        Post Anesthesia Care and Evaluation    Patient location during evaluation: PACU  Patient participation: complete - patient participated  Level of consciousness: awake and awake and alert  Pain score: 0  Pain management: adequate    Airway patency: patent  Anesthetic complications: No anesthetic complications    Cardiovascular status: acceptable and stable  Respiratory status: acceptable and unassisted  Hydration status: acceptable

## 2022-11-23 ENCOUNTER — HOSPITAL ENCOUNTER (OUTPATIENT)
Dept: ULTRASOUND IMAGING | Facility: HOSPITAL | Age: 22
Discharge: HOME | End: 2022-11-23
Admitting: NURSE PRACTITIONER

## 2022-11-23 DIAGNOSIS — N83.202 UNSPECIFIED OVARIAN CYST, LEFT SIDE: ICD-10-CM

## 2022-11-23 PROCEDURE — 76856 US EXAM PELVIC COMPLETE: CPT

## 2024-04-08 ENCOUNTER — LAB REQUISITION (OUTPATIENT)
Facility: HOSPITAL | Age: 24
End: 2024-04-08

## 2024-04-08 ENCOUNTER — HOSPITAL ENCOUNTER (OUTPATIENT)
Dept: GENERAL RADIOLOGY | Facility: HOSPITAL | Age: 24
Discharge: HOME | End: 2024-04-08
Admitting: NURSE PRACTITIONER

## 2024-04-08 ENCOUNTER — TRANSCRIBE ORDERS (OUTPATIENT)
Dept: GENERAL RADIOLOGY | Facility: HOSPITAL | Age: 24
End: 2024-04-08
Payer: COMMERCIAL

## 2024-04-08 DIAGNOSIS — R76.11 POSITIVE TUBERCULIN TEST: Primary | ICD-10-CM

## 2024-04-08 DIAGNOSIS — R76.11 POSITIVE SKIN TEST FOR TUBERCULOSIS: ICD-10-CM

## 2024-04-08 DIAGNOSIS — Z00.00 ENCOUNTER FOR GENERAL ADULT MEDICAL EXAMINATION WITHOUT ABNORMAL FINDINGS: ICD-10-CM

## 2024-04-08 DIAGNOSIS — R76.11 POSITIVE TUBERCULIN TEST: ICD-10-CM

## 2024-04-08 LAB — HBV SURFACE AB SER RIA-ACNC: NORMAL

## 2024-04-08 PROCEDURE — 71046 X-RAY EXAM CHEST 2 VIEWS: CPT

## 2024-04-08 PROCEDURE — 86706 HEP B SURFACE ANTIBODY: CPT | Performed by: NURSE PRACTITIONER

## 2024-04-08 PROCEDURE — 86480 TB TEST CELL IMMUN MEASURE: CPT | Performed by: NURSE PRACTITIONER

## 2024-04-10 ENCOUNTER — TRANSCRIBE ORDERS (OUTPATIENT)
Dept: ADMINISTRATIVE | Facility: HOSPITAL | Age: 24
End: 2024-04-10
Payer: COMMERCIAL

## 2024-04-10 DIAGNOSIS — R76.11 POSITIVE SKIN TEST FOR TUBERCULOSIS: Primary | ICD-10-CM

## 2024-04-10 LAB
GAMMA INTERFERON BACKGROUND BLD IA-ACNC: 0.08 IU/ML
M TB IFN-G BLD-IMP: POSITIVE
M TB IFN-G CD4+ BCKGRND COR BLD-ACNC: 0.78 IU/ML
M TB IFN-G CD4+CD8+ BCKGRND COR BLD-ACNC: 0.69 IU/ML
MITOGEN IGNF BCKGRD COR BLD-ACNC: >10 IU/ML
QUANTIFERON INCUBATION: ABNORMAL
SERVICE CMNT-IMP: NORMAL

## 2024-07-16 ENCOUNTER — INITIAL PRENATAL (OUTPATIENT)
Age: 24
End: 2024-07-16
Payer: COMMERCIAL

## 2024-07-16 VITALS — DIASTOLIC BLOOD PRESSURE: 92 MMHG | SYSTOLIC BLOOD PRESSURE: 136 MMHG | WEIGHT: 267 LBS | BODY MASS INDEX: 41.2 KG/M2

## 2024-07-16 DIAGNOSIS — Z3A.01 7 WEEKS GESTATION OF PREGNANCY: Primary | ICD-10-CM

## 2024-07-16 DIAGNOSIS — Z12.4 CERVICAL CANCER SCREENING: ICD-10-CM

## 2024-07-16 DIAGNOSIS — Z34.01 PRIMIGRAVIDA IN FIRST TRIMESTER: ICD-10-CM

## 2024-07-16 DIAGNOSIS — O21.0 MORNING SICKNESS: ICD-10-CM

## 2024-07-16 PROCEDURE — G0123 SCREEN CERV/VAG THIN LAYER: HCPCS

## 2024-07-16 RX ORDER — ONDANSETRON 4 MG/1
4 TABLET, ORALLY DISINTEGRATING ORAL EVERY 6 HOURS PRN
Qty: 120 TABLET | Refills: 2 | Status: SHIPPED | OUTPATIENT
Start: 2024-07-16

## 2024-07-16 RX ORDER — PRENATAL VIT NO.126/IRON/FOLIC 28MG-0.8MG
1 TABLET ORAL DAILY
COMMUNITY
End: 2024-07-22

## 2024-07-16 NOTE — PROGRESS NOTES
24-year old patient arrived to initiate prenatal care.     HPI: . No LMP recorded. Patient is pregnant.  Pregnancy was a surprise due to concerns with infertility. She is happy. Pre-pregnancy weight of 267.    Previous prenatal history significant for: nullip  History significant for: multiple ovarian cysts, HTN, elevated glucose, anxiety, GERD    The following portion of the patient's history were reviewed and updated as needed: allergies, current medications, past family history, past medical history, social history, surgical history, and problem list.    ROS: All systems reviewed and are negative with exception of the following: nausea and vomiting    US ordered today, reviewed and shows IUP of 7w3d gestation and Estimated Date of Delivery: 3/1/25    Pap Smear: today    Exam:  Wt: 267 lb for TWG of 0 kg (0 lb), B/P 136/92, FHTs 151   General Appearance:  healthy-appearing . Appropriate mood and behavior.  HEENT:  Neck supple, no thyroidmegaly.  Cardiorespiratory: HR str and reg. No murmur. Lungs clear. Resp even and unlabored.  Abd: Soft, nontender. No CVA tenderness.   Ext: Calves non-tender. No cyanosis or edema.    Diagnoses and all orders for this visit:    1. 7 weeks gestation of pregnancy (Primary)  Reviewed information in new OB packet, including OTC medications for use during pregnancy, first trimester of pregnancy and discomforts, regular OB routine, ffDNA/chromosomal risk and maternal carrier screening testing.  Advised to maintain regular activity and/or exercise. Discussed bleeding and pelvic pain warnings and other signs to report. Discussed and ordered initial prenatal labs today. First Trimester of Pregnancy video and morning sickness included in AVS.   -     ABO / Rh  -     Ambulatory Referral to MFM/Perinatology  -     Antibody Screen  -     CBC & Differential  -     Chlamydia trachomatis, Neisseria gonorrhoeae, PCR w/ confirmation - Urine, Urine, Clean Catch  -     ToxASSURE Select  13 (MW) - Urine, Clean Catch  -     HCV Antibody Rfx To Qnt PCR  -     Hepatitis B Surface Antigen  -     HIV-1 / O / 2 Ag / Antibody  -     RPR, Rfx Qn RPR / Confirm TP  -     Rubella Antibody, IgG  -     Urine Culture - , Urine, Clean Catch  -     Varicella Zoster Antibody, IgG    2. Primigravida in first trimester    3. Morning sickness  Discussed nausea relief measures both nonpharmacologic (small frequent meals or snacks, avoiding triggers, aromatherapy, ginger, hard and/or sour candies or Preggie Pops, ginger, accu-pressure wrist bands, citrus/citric acid) and pharmacologic (both OTC and Rx). Encouraged adequate hydration and intake of calories. Patient to notify provider if symptoms persist or worsen Morning Sickness and Doxylamine; Vitamin B6 Delayed- and Extended-Release Tablets education included in the AVS.   -     ondansetron ODT (ZOFRAN-ODT) 4 MG disintegrating tablet; Place 1 tablet on the tongue Every 6 (Six) Hours As Needed for Nausea or Vomiting.  Dispense: 120 tablet; Refill: 2    4. Cervical cancer screening  -     Liquid-based Pap Smear, Screening      Return to the office in 4 weeks for routine follow up and as needed with concerns.    This note has been signed electronically.   Margarita Madrid CNM APRN

## 2024-07-17 LAB
GEN CATEG CVX/VAG CYTO-IMP: NORMAL
LAB AP CASE REPORT: NORMAL
LAB AP GYN ADDITIONAL INFORMATION: NORMAL
LAB AP GYN OTHER FINDINGS: NORMAL
Lab: NORMAL
PATH INTERP SPEC-IMP: NORMAL
STAT OF ADQ CVX/VAG CYTO-IMP: NORMAL

## 2024-07-22 ENCOUNTER — PATIENT MESSAGE (OUTPATIENT)
Age: 24
End: 2024-07-22
Payer: COMMERCIAL

## 2024-07-22 DIAGNOSIS — Z3A.08 8 WEEKS GESTATION OF PREGNANCY: Primary | ICD-10-CM

## 2024-07-22 RX ORDER — VITAMIN A ACETATE, BETA CAROTENE, ASCORBIC ACID, CHOLECALCIFEROL, .ALPHA.-TOCOPHEROL ACETATE, DL-, THIAMINE MONONITRATE, RIBOFLAVIN, NIACINAMIDE, PYRIDOXINE HYDROCHLORIDE, FOLIC ACID, CYANOCOBALAMIN, CALCIUM CARBONATE, FERROUS FUMARATE, ZINC OXIDE, CUPRIC OXIDE 3080; 12; 120; 400; 1; 1.84; 3; 20; 22; 920; 25; 200; 27; 10; 2 [IU]/1; UG/1; MG/1; [IU]/1; MG/1; MG/1; MG/1; MG/1; MG/1; [IU]/1; MG/1; MG/1; MG/1; MG/1; MG/1
1 TABLET, FILM COATED ORAL DAILY
Qty: 90 TABLET | Refills: 3 | Status: SHIPPED | OUTPATIENT
Start: 2024-07-22

## 2024-07-23 DIAGNOSIS — O23.41 URINARY TRACT INFECTION IN MOTHER DURING FIRST TRIMESTER OF PREGNANCY: Primary | ICD-10-CM

## 2024-07-23 LAB
ABO GROUP BLD: NORMAL
BACTERIA UR CULT: ABNORMAL
BASOPHILS # BLD AUTO: 0 X10E3/UL (ref 0–0.2)
BASOPHILS NFR BLD AUTO: 0 %
BLD GP AB SCN SERPL QL: NEGATIVE
C TRACH RRNA SPEC QL NAA+PROBE: NEGATIVE
DRUGS UR: NORMAL
EOSINOPHIL # BLD AUTO: 0 X10E3/UL (ref 0–0.4)
EOSINOPHIL NFR BLD AUTO: 0 %
ERYTHROCYTE [DISTWIDTH] IN BLOOD BY AUTOMATED COUNT: 12.2 % (ref 11.7–15.4)
HBV SURFACE AG SERPL QL IA: NEGATIVE
HCT VFR BLD AUTO: 42.3 % (ref 34–46.6)
HCV AB SERPL QL IA: NORMAL
HCV IGG SERPL QL IA: NON REACTIVE
HGB BLD-MCNC: 14.2 G/DL (ref 11.1–15.9)
HIV 1+2 AB+HIV1 P24 AG SERPL QL IA: NON REACTIVE
IMM GRANULOCYTES # BLD AUTO: 0 X10E3/UL (ref 0–0.1)
IMM GRANULOCYTES NFR BLD AUTO: 0 %
LYMPHOCYTES # BLD AUTO: 3 X10E3/UL (ref 0.7–3.1)
LYMPHOCYTES NFR BLD AUTO: 32 %
MCH RBC QN AUTO: 28.7 PG (ref 26.6–33)
MCHC RBC AUTO-ENTMCNC: 33.6 G/DL (ref 31.5–35.7)
MCV RBC AUTO: 86 FL (ref 79–97)
MONOCYTES # BLD AUTO: 0.5 X10E3/UL (ref 0.1–0.9)
MONOCYTES NFR BLD AUTO: 6 %
N GONORRHOEA RRNA SPEC QL NAA+PROBE: NEGATIVE
NEUTROPHILS # BLD AUTO: 5.8 X10E3/UL (ref 1.4–7)
NEUTROPHILS NFR BLD AUTO: 62 %
OTHER ANTIBIOTIC SUSC ISLT: ABNORMAL
PLATELET # BLD AUTO: 292 X10E3/UL (ref 150–450)
RBC # BLD AUTO: 4.94 X10E6/UL (ref 3.77–5.28)
RH BLD: NEGATIVE
RPR SER QL: NON REACTIVE
RUBV IGG SERPL IA-ACNC: 3.74 INDEX
VZV IGG SER IA-ACNC: 1955 INDEX
WBC # BLD AUTO: 9.3 X10E3/UL (ref 3.4–10.8)

## 2024-07-23 RX ORDER — NITROFURANTOIN 25; 75 MG/1; MG/1
100 CAPSULE ORAL EVERY 12 HOURS SCHEDULED
Qty: 14 CAPSULE | Refills: 0 | Status: SHIPPED | OUTPATIENT
Start: 2024-07-23 | End: 2024-07-30

## 2024-07-24 ENCOUNTER — DOCUMENTATION (OUTPATIENT)
Age: 24
End: 2024-07-24
Payer: COMMERCIAL

## 2024-07-24 DIAGNOSIS — B37.9 ANTIBIOTIC-INDUCED YEAST INFECTION: Primary | ICD-10-CM

## 2024-07-24 DIAGNOSIS — T36.95XA ANTIBIOTIC-INDUCED YEAST INFECTION: Primary | ICD-10-CM

## 2024-07-24 NOTE — PROGRESS NOTES
Informed pt Margarita has called in yeast infection medication in case she is to start symptoms due to antibiotics she has started. Pt stated understanding

## 2024-08-16 ENCOUNTER — ROUTINE PRENATAL (OUTPATIENT)
Age: 24
End: 2024-08-16
Payer: COMMERCIAL

## 2024-08-16 VITALS — SYSTOLIC BLOOD PRESSURE: 138 MMHG | WEIGHT: 259 LBS | DIASTOLIC BLOOD PRESSURE: 94 MMHG | BODY MASS INDEX: 39.97 KG/M2

## 2024-08-16 DIAGNOSIS — O10.919 CHRONIC HYPERTENSION AFFECTING PREGNANCY: ICD-10-CM

## 2024-08-16 DIAGNOSIS — Z3A.11 11 WEEKS GESTATION OF PREGNANCY: Primary | ICD-10-CM

## 2024-08-16 LAB
BILIRUB BLD-MCNC: ABNORMAL MG/DL
CLARITY, POC: ABNORMAL
COLOR UR: YELLOW
GLUCOSE UR STRIP-MCNC: NEGATIVE MG/DL
KETONES UR QL: ABNORMAL
LEUKOCYTE EST, POC: ABNORMAL
NITRITE UR-MCNC: NEGATIVE MG/ML
PH UR: 6.5 [PH] (ref 5–8)
PROT UR STRIP-MCNC: ABNORMAL MG/DL
RBC # UR STRIP: ABNORMAL /UL
SP GR UR: 1.02 (ref 1–1.03)
UROBILINOGEN UR QL: NORMAL

## 2024-08-16 RX ORDER — ASPIRIN 81 MG/1
81 TABLET ORAL DAILY
Qty: 30 TABLET | Refills: 11 | Status: SHIPPED | OUTPATIENT
Start: 2024-08-16

## 2024-08-16 RX ORDER — METOCLOPRAMIDE 10 MG/1
10 TABLET ORAL 4 TIMES DAILY PRN
Qty: 30 TABLET | Refills: 2 | Status: SHIPPED | OUTPATIENT
Start: 2024-08-16

## 2024-08-16 NOTE — PROGRESS NOTES
Desires NIPS, carrier screening. Still with n/v but some improvement. Pt with CHTN and was previously on HCTZ. BP first visit and today with  mildly elevated BP, no meds.  Denies HA.     /94   Wt 117 kg (259 lb)   BMI 39.97 kg/m²    FHTs 128      Diagnoses and all orders for this visit:    1. 11 weeks gestation of pregnancy (Primary)  -     POC Urinalysis Dipstick  -     Prosper Panorama Prenatal Test: Chromosomes 13, 18, 21, X & Y: Triploidy 22Q.11.2 Deletion - Blood, Blood, Venous  -     Prosper Horizon 14 (Pan-Ethnic Standard) - Blood, Blood, Venous  -     Protein / Creatinine Ratio, Urine - Urine, Clean Catch  -     Urine Culture - Urine, Urine, Clean Catch  IUP at 11+ weeks gestation, doing well but still having N/V.  Added reglan and discussed vit B6/unisom at night. Baseline PCR obtained today.  Will watch BP closely and start medication if needed. Pt with prior CHTN.  RTC 4 weeks. NIPS, carrier screening today. US sneak peak next visit.   2. Chronic hypertension affecting pregnancy    Other orders  -     metoclopramide (Reglan) 10 MG tablet; Take 1 tablet by mouth 4 (Four) Times a Day As Needed (nausea).  Dispense: 30 tablet; Refill: 2  -     aspirin 81 MG EC tablet; Take 1 tablet by mouth Daily.  Dispense: 30 tablet; Refill: 11

## 2024-08-17 LAB
CREAT UR-MCNC: 80.6 MG/DL
PROT UR-MCNC: 10.2 MG/DL
PROT/CREAT UR: 127 MG/G CREAT (ref 0–200)

## 2024-08-18 LAB
BACTERIA UR CULT: NORMAL
BACTERIA UR CULT: NORMAL

## 2024-08-24 LAB
Lab: NEGATIVE
Lab: NORMAL
NTRA ALPHA-THALASSEMIA: NEGATIVE
NTRA BETA-HEMOGLOBINOPATHIES: NEGATIVE
NTRA CANAVAN DISEASE: NEGATIVE
NTRA CYSTIC FIBROSIS: NEGATIVE
NTRA DUCHENNE/BECKER MUSCULAR DYSTROPHY: NEGATIVE
NTRA FAMILIAL DYSAUTONOMIA: NEGATIVE
NTRA FRAGILE X SYNDROME: NEGATIVE
NTRA GALACTOSEMIA: NEGATIVE
NTRA GAUCHER DISEASE: NEGATIVE
NTRA MEDIUM CHAIN ACYL-COA DEHYDROGENASE DEFICIENCY: NEGATIVE
NTRA POLYCYSTIC KIDNEY DISEASE, AUTOSOMAL RECESSIVE: NEGATIVE
NTRA SMITH-LEMLI-OPITZ SYNDROME: NEGATIVE
NTRA SPINAL MUSCULAR ATROPHY: NEGATIVE
NTRA TAY-SACHS DISEASE: NEGATIVE

## 2024-09-14 PROBLEM — K21.00 GASTROESOPHAGEAL REFLUX DISEASE WITH ESOPHAGITIS WITHOUT HEMORRHAGE: Status: RESOLVED | Noted: 2022-10-17 | Resolved: 2024-09-14

## 2024-09-14 PROBLEM — K35.80 ACUTE APPENDICITIS: Status: RESOLVED | Noted: 2018-05-08 | Resolved: 2024-09-14

## 2024-09-14 PROBLEM — R11.2 NAUSEA & VOMITING: Status: RESOLVED | Noted: 2022-10-17 | Resolved: 2024-09-14

## 2024-09-16 ENCOUNTER — ROUTINE PRENATAL (OUTPATIENT)
Age: 24
End: 2024-09-16
Payer: COMMERCIAL

## 2024-09-16 VITALS — BODY MASS INDEX: 39.97 KG/M2 | DIASTOLIC BLOOD PRESSURE: 80 MMHG | WEIGHT: 259 LBS | SYSTOLIC BLOOD PRESSURE: 136 MMHG

## 2024-09-16 DIAGNOSIS — Z34.02 PRIMIPARITY, SECOND TRIMESTER: ICD-10-CM

## 2024-09-16 DIAGNOSIS — Z3A.16 16 WEEKS GESTATION OF PREGNANCY: Primary | ICD-10-CM

## 2024-09-16 DIAGNOSIS — K59.09 OTHER CONSTIPATION: ICD-10-CM

## 2024-09-16 DIAGNOSIS — O10.919 CHRONIC HYPERTENSION AFFECTING PREGNANCY: ICD-10-CM

## 2024-09-16 PROBLEM — Z86.79 HISTORY OF HYPERTENSION: Status: ACTIVE | Noted: 2022-03-04

## 2024-09-16 LAB
GLUCOSE UR STRIP-MCNC: NEGATIVE MG/DL
PROT UR STRIP-MCNC: ABNORMAL MG/DL

## 2024-09-16 PROCEDURE — 0502F SUBSEQUENT PRENATAL CARE: CPT

## 2024-09-25 ENCOUNTER — TELEPHONE (OUTPATIENT)
Dept: OBSTETRICS AND GYNECOLOGY | Age: 24
End: 2024-09-25
Payer: COMMERCIAL

## 2024-10-16 ENCOUNTER — ROUTINE PRENATAL (OUTPATIENT)
Age: 24
End: 2024-10-16
Payer: COMMERCIAL

## 2024-10-16 VITALS — BODY MASS INDEX: 39.92 KG/M2 | DIASTOLIC BLOOD PRESSURE: 90 MMHG | SYSTOLIC BLOOD PRESSURE: 142 MMHG | WEIGHT: 258.7 LBS

## 2024-10-16 DIAGNOSIS — Z3A.20 20 WEEKS GESTATION OF PREGNANCY: Primary | ICD-10-CM

## 2024-10-16 DIAGNOSIS — N93.9 VAGINAL SPOTTING: ICD-10-CM

## 2024-10-16 LAB
GLUCOSE UR STRIP-MCNC: NEGATIVE MG/DL
PROT UR STRIP-MCNC: ABNORMAL MG/DL

## 2024-10-16 RX ORDER — TERCONAZOLE 0.4 %
1 CREAM WITH APPLICATOR VAGINAL NIGHTLY
Qty: 45 G | Refills: 0 | Status: SHIPPED | OUTPATIENT
Start: 2024-10-16

## 2024-10-16 NOTE — PROGRESS NOTES
Having spotting off and on for 3 weeks. Worse with BM.  Denies vaginal discharge, odor or itching. GFM. US today with anterior placenta clear of os.  Normal anatomy US with Dr. Floyd.     /90   Wt 117 kg (258 lb 11.2 oz)   BMI 39.92 kg/m²    FHTs 150  Urine protein trace, urine glucose  Pelvic:  normal external genitalia, cervix closed/thick/smooth, white cottage discharge in the vault, nuswab done    Diagnoses and all orders for this visit:    1. 20 weeks gestation of pregnancy (Primary)  -     POC Urinalysis Dipstick  Iup at 20 weeks gestation, doing well. Flu shot today.  RTC 4 weeks.   2. Vaginal spotting  Vaginal spotting most like from yeast vaginitis, terazol 0.4% for 7 nights. Nuswab pending. US normal today.   Other orders  -     terconazole (TERAZOL 7) 0.4 % vaginal cream; Insert 1 applicator into the vagina Every Night.  Dispense: 45 g; Refill: 0  -     Fluzone >6mos (1325-1331)

## 2024-10-18 LAB
A VAGINAE DNA VAG QL NAA+PROBE: NORMAL SCORE
BVAB2 DNA VAG QL NAA+PROBE: NORMAL SCORE
C ALBICANS DNA VAG QL NAA+PROBE: NEGATIVE
C GLABRATA DNA VAG QL NAA+PROBE: NEGATIVE
MEGA1 DNA VAG QL NAA+PROBE: NORMAL SCORE

## 2024-11-15 NOTE — PROGRESS NOTES
Reason for visit: Routine OB visit at 25w3d     CC:  Having sweaty, clammy, nauseaous episodes about 45-60 minutes about meals. She also continues to struggle with constipation. Endorses appropriate fetal movement. Denies VB, LOF, contractions, h/a, visual changes, and right upper quadrant pain.     ROS: All systems reviewed and are negative with exception of the following: reactive hypoglycemia, constipation, amenorrhea    Wt 119 kg (262 lb) lb for a TWG of -2.268 kg (-5 lb), /84, FHTs 142, FH 25  Urine today and reviewed: 1+ glucose, neg protein    23 week Anatomy scan: EFW 64%; TIMBO wnl, transverse, anterior placenta with no evidence of placenta previa. Anatomy complete & wnl.    Exam:  General Appearance:  No visualized signs of distress. Normal mood and behavior  Cardiorespiratory: HR str and reg. Lungs clear. Resp even and unlabored.  Abdomen: is soft and nontender. no CVA tenderness. Uterus is round and soft.  Ext: no edema. Calves non-tender.     Impression  Diagnoses and all orders for this visit:    1. 25 weeks gestation of pregnancy (Primary)  Discussed second trimester of pregnancy, discomforts and measures of support, fetal movement,  labor warnings, preeclampsia warnings, and signs and symptoms to report. Also discussed with patient plan for hemoglobin and glucose tolerance testing. Instructions on glucose tolerance test and dietary intake prior to the test provided. Patient to notify provider or come to the hospital with vaginal bleeding, leaking of fluid, contractions, or other concerns. Second Trimester of Pregnancy video and Round Ligament Pain education included in the AVS.    2. Primiparity, second trimester    3. Chronic hypertension affecting pregnancy    4. Reactive hypoglycemia  Extensive nutrition education done at this time discussing the importance of higher protein, higher fiber, reduction of simple carbs, etc. Recommended the book Real Food for Pregnancy by Sofiya Frias  RDE.      5. Other Constipation  Increase dietary sources of fiber, especially cooked vegetables and whole grains. Increase water intake. Remain active (walking is great). A fiber supplement such as psyllium is safe to take. Miralax may be continued daily. A stool softener such as colace may be taken BID. Sennokot should be kept only as a last resort. Adult constipation information in the AVS but consult pregnancy safe medication list.         Return to the office in 3 weeks for routine visit with Dr Peralta and as needed with concerns.    This note has been signed electronically.  JASON Marks, CNM

## 2024-11-18 ENCOUNTER — ROUTINE PRENATAL (OUTPATIENT)
Age: 24
End: 2024-11-18
Payer: COMMERCIAL

## 2024-11-18 VITALS — BODY MASS INDEX: 40.43 KG/M2 | WEIGHT: 262 LBS | DIASTOLIC BLOOD PRESSURE: 84 MMHG | SYSTOLIC BLOOD PRESSURE: 112 MMHG

## 2024-11-18 DIAGNOSIS — Z34.02 PRIMIPARITY, SECOND TRIMESTER: ICD-10-CM

## 2024-11-18 DIAGNOSIS — E16.1 REACTIVE HYPOGLYCEMIA: ICD-10-CM

## 2024-11-18 DIAGNOSIS — O10.919 CHRONIC HYPERTENSION AFFECTING PREGNANCY: ICD-10-CM

## 2024-11-18 DIAGNOSIS — Z3A.25 25 WEEKS GESTATION OF PREGNANCY: Primary | ICD-10-CM

## 2024-11-18 DIAGNOSIS — K59.09 OTHER CONSTIPATION: ICD-10-CM

## 2024-11-19 RX ORDER — POLYETHYLENE GLYCOL 3350 17 G/17G
17 POWDER, FOR SOLUTION ORAL DAILY
COMMUNITY

## 2024-11-19 RX ORDER — SENNOSIDES A AND B 8.6 MG/1
1 TABLET, FILM COATED ORAL DAILY
COMMUNITY

## 2024-12-13 ENCOUNTER — ROUTINE PRENATAL (OUTPATIENT)
Age: 24
End: 2024-12-13
Payer: COMMERCIAL

## 2024-12-13 VITALS — SYSTOLIC BLOOD PRESSURE: 128 MMHG | DIASTOLIC BLOOD PRESSURE: 86 MMHG | WEIGHT: 266.8 LBS | BODY MASS INDEX: 41.17 KG/M2

## 2024-12-13 DIAGNOSIS — O10.919 CHRONIC HYPERTENSION AFFECTING PREGNANCY: ICD-10-CM

## 2024-12-13 DIAGNOSIS — Z67.91 RH NEGATIVE STATUS DURING PREGNANCY IN THIRD TRIMESTER: ICD-10-CM

## 2024-12-13 DIAGNOSIS — O26.893 RH NEGATIVE STATUS DURING PREGNANCY IN THIRD TRIMESTER: ICD-10-CM

## 2024-12-13 DIAGNOSIS — Z3A.28 28 WEEKS GESTATION OF PREGNANCY: Primary | ICD-10-CM

## 2024-12-13 DIAGNOSIS — R12 HEARTBURN: ICD-10-CM

## 2024-12-13 RX ORDER — OMEPRAZOLE 20 MG/1
20 TABLET, DELAYED RELEASE ORAL DAILY
Qty: 28 TABLET | Refills: 4 | Status: SHIPPED | OUTPATIENT
Start: 2024-12-13 | End: 2025-12-13

## 2024-12-13 NOTE — PROGRESS NOTES
Having frequent heartburn despite tums.  GFM. Denies VB, LOF, ctx. 1 hour GTT today. Will need Rhogam today.     /86   Wt 121 kg (266 lb 12.8 oz)   BMI 41.17 kg/m²    FHTs 142  Urine protein 1+, glucse negative, 2+ leuk      Diagnoses and all orders for this visit:    1. 28 weeks gestation of pregnancy (Primary)  -     RPR, Rfx Qn RPR / Confirm TP  -     Gestational Screen 1 Hr ([facility])  -     CBC & Differential  -     Rhogam Immune Globulin Immunization  IUP at 28+ weeks gestation, doing well. 1 hour GTT today. RTC 2 weeks. Had tdap 7/2025 at work.   2. Rh negative status during pregnancy in third trimester  Rhogam today  3. Chronic hypertension affecting pregnancy  BP stable, no meds  4. Heartburn  Add prilosec 20 mg po daily.  Other orders  -     omeprazole OTC (PrilOSEC OTC) 20 MG EC tablet; Take 1 tablet by mouth Daily.  Dispense: 28 tablet; Refill: 4

## 2024-12-14 LAB
BASOPHILS # BLD AUTO: 0 X10E3/UL (ref 0–0.2)
BASOPHILS NFR BLD AUTO: 0 %
EOSINOPHIL # BLD AUTO: 0.1 X10E3/UL (ref 0–0.4)
EOSINOPHIL NFR BLD AUTO: 1 %
ERYTHROCYTE [DISTWIDTH] IN BLOOD BY AUTOMATED COUNT: 12.9 % (ref 11.7–15.4)
GLUCOSE 1H P 50 G GLC PO SERPL-MCNC: 101 MG/DL (ref 70–139)
HCT VFR BLD AUTO: 36 % (ref 34–46.6)
HGB BLD-MCNC: 11.7 G/DL (ref 11.1–15.9)
IMM GRANULOCYTES # BLD AUTO: 0 X10E3/UL (ref 0–0.1)
IMM GRANULOCYTES NFR BLD AUTO: 0 %
LYMPHOCYTES # BLD AUTO: 2.6 X10E3/UL (ref 0.7–3.1)
LYMPHOCYTES NFR BLD AUTO: 27 %
MCH RBC QN AUTO: 28.5 PG (ref 26.6–33)
MCHC RBC AUTO-ENTMCNC: 32.5 G/DL (ref 31.5–35.7)
MCV RBC AUTO: 88 FL (ref 79–97)
MONOCYTES # BLD AUTO: 0.5 X10E3/UL (ref 0.1–0.9)
MONOCYTES NFR BLD AUTO: 5 %
NEUTROPHILS # BLD AUTO: 6.6 X10E3/UL (ref 1.4–7)
NEUTROPHILS NFR BLD AUTO: 67 %
PLATELET # BLD AUTO: 272 X10E3/UL (ref 150–450)
RBC # BLD AUTO: 4.11 X10E6/UL (ref 3.77–5.28)
RPR SER QL: NON REACTIVE
WBC # BLD AUTO: 9.8 X10E3/UL (ref 3.4–10.8)

## 2024-12-26 ENCOUNTER — ROUTINE PRENATAL (OUTPATIENT)
Age: 24
End: 2024-12-26
Payer: COMMERCIAL

## 2024-12-26 VITALS — WEIGHT: 268 LBS | BODY MASS INDEX: 41.36 KG/M2 | SYSTOLIC BLOOD PRESSURE: 136 MMHG | DIASTOLIC BLOOD PRESSURE: 84 MMHG

## 2024-12-26 DIAGNOSIS — Z34.03 PRIMIGRAVIDA, THIRD TRIMESTER: ICD-10-CM

## 2024-12-26 DIAGNOSIS — Z3A.30 30 WEEKS GESTATION OF PREGNANCY: Primary | ICD-10-CM

## 2024-12-26 DIAGNOSIS — O10.919 CHRONIC HYPERTENSION AFFECTING PREGNANCY: ICD-10-CM

## 2024-12-26 NOTE — PROGRESS NOTES
"Reason for visit: Routine OB visit at 30w5d     CC:  Feeling okay, she is having the \"normal aches and pains of pregnancy\". She is still having some constipation but says colace is helping. She has only had a few sips of water so far today. Endorses appropriate fetal movement. Denies VB, LOF, contractions, h/a, visual changes, and right upper quadrant pain.     ROS: All systems reviewed and are negative with exception of the following: round ligament pain, constipation, amenorrhea    Wt 122 kg (268 lb) lb for a TWG of 0.454 kg (1 lb), /84, FHTs 136, FH 31  Urine today and reviewed: neg glucose, +1 protein    Anatomy scan:EFW  64%; TIMBO wnl, transverse, anterior placenta with no evidence of placenta previa.    Exam:  General Appearance:  No visualized signs of distress. Normal mood and behavior  Cardiorespiratory: HR str and reg. Lungs clear. Resp even and unlabored.  Abdomen: is soft and nontender. no CVA tenderness. Uterus is round and soft.  Ext: no edema. Calves non-tender.     Impression  Diagnoses and all orders for this visit:    1. 30 weeks gestation of pregnancy (Primary)  Discussed third trimester of pregnancy, discomforts and measures of support, fetal movement counts,  labor warnings, preeclampsia warnings, and signs and symptoms to report. Reviewed glucose tolerance test and hemoglobin results. Patient to notify provider and/or come to the hospital with vaginal bleeding, leaking of fluid, contraction, or other concerns. Third Trimester of Pregnancy video included in the AVS.     2. Primigravida, third trimester    3. Chronic hypertension affecting pregnancy            Return to the office in 2 weeks for routine follow up with me/Dr Peralta and as needed with concerns.    This note has been signed electronically.  JASON Marks, RUPA  "

## 2025-01-07 ENCOUNTER — ROUTINE PRENATAL (OUTPATIENT)
Age: 25
End: 2025-01-07
Payer: COMMERCIAL

## 2025-01-07 VITALS — SYSTOLIC BLOOD PRESSURE: 122 MMHG | DIASTOLIC BLOOD PRESSURE: 80 MMHG | WEIGHT: 269 LBS | BODY MASS INDEX: 41.51 KG/M2

## 2025-01-07 DIAGNOSIS — O10.919 CHRONIC HYPERTENSION AFFECTING PREGNANCY: ICD-10-CM

## 2025-01-07 DIAGNOSIS — Z3A.32 32 WEEKS GESTATION OF PREGNANCY: Primary | ICD-10-CM

## 2025-01-07 NOTE — PROGRESS NOTES
Having more pelvic pressure and cramping.  GFM. Denies VB, LOF, ctx. No PIH symptoms    /80   Wt 122 kg (269 lb)   BMI 41.51 kg/m²    FHTs 140  Urine protein and glucose negative      Diagnoses and all orders for this visit:    1. 32 weeks gestation of pregnancy (Primary)  IUP at 32 weeks gestation, doing well. US today with normal growth, EFW 2209g, 75%.  RTC 2 weeks. RSV next visit   2. Chronic hypertension affecting pregnancy  No meds. BP stable and UP negative.

## 2025-01-20 ENCOUNTER — ROUTINE PRENATAL (OUTPATIENT)
Age: 25
End: 2025-01-20
Payer: COMMERCIAL

## 2025-01-20 ENCOUNTER — HOSPITAL ENCOUNTER (OUTPATIENT)
Facility: HOSPITAL | Age: 25
Discharge: HOME | End: 2025-01-20
Attending: OBSTETRICS & GYNECOLOGY | Admitting: OBSTETRICS & GYNECOLOGY
Payer: COMMERCIAL

## 2025-01-20 VITALS — BODY MASS INDEX: 42.28 KG/M2 | WEIGHT: 274 LBS | DIASTOLIC BLOOD PRESSURE: 104 MMHG | SYSTOLIC BLOOD PRESSURE: 140 MMHG

## 2025-01-20 VITALS
BODY MASS INDEX: 41.83 KG/M2 | WEIGHT: 276 LBS | DIASTOLIC BLOOD PRESSURE: 79 MMHG | HEIGHT: 68 IN | RESPIRATION RATE: 16 BRPM | HEART RATE: 98 BPM | OXYGEN SATURATION: 99 % | SYSTOLIC BLOOD PRESSURE: 135 MMHG | TEMPERATURE: 97 F

## 2025-01-20 DIAGNOSIS — O10.919 CHRONIC HYPERTENSION AFFECTING PREGNANCY: ICD-10-CM

## 2025-01-20 DIAGNOSIS — Z3A.34 34 WEEKS GESTATION OF PREGNANCY: Primary | ICD-10-CM

## 2025-01-20 DIAGNOSIS — O12.13 PROTEINURIA AFFECTING PREGNANCY IN THIRD TRIMESTER: ICD-10-CM

## 2025-01-20 DIAGNOSIS — Z34.03 PRIMIGRAVIDA, THIRD TRIMESTER: ICD-10-CM

## 2025-01-20 PROBLEM — Z34.90 PREGNANCY: Status: ACTIVE | Noted: 2025-01-20

## 2025-01-20 LAB
ALBUMIN SERPL-MCNC: 3.6 G/DL (ref 3.5–5.2)
ALBUMIN/GLOB SERPL: 1.4 G/DL
ALP SERPL-CCNC: 91 U/L (ref 39–117)
ALT SERPL W P-5'-P-CCNC: 10 U/L (ref 1–33)
ANION GAP SERPL CALCULATED.3IONS-SCNC: 12 MMOL/L (ref 5–15)
AST SERPL-CCNC: 11 U/L (ref 1–32)
BILIRUB SERPL-MCNC: 0.2 MG/DL (ref 0–1.2)
BUN SERPL-MCNC: 6 MG/DL (ref 6–20)
BUN/CREAT SERPL: 16.7 (ref 7–25)
CALCIUM SPEC-SCNC: 9.1 MG/DL (ref 8.6–10.5)
CHLORIDE SERPL-SCNC: 103 MMOL/L (ref 98–107)
CO2 SERPL-SCNC: 21 MMOL/L (ref 22–29)
CREAT SERPL-MCNC: 0.36 MG/DL (ref 0.57–1)
DEPRECATED RDW RBC AUTO: 41.7 FL (ref 37–54)
EGFRCR SERPLBLD CKD-EPI 2021: 144.7 ML/MIN/1.73
ERYTHROCYTE [DISTWIDTH] IN BLOOD BY AUTOMATED COUNT: 13.6 % (ref 12.3–15.4)
GLOBULIN UR ELPH-MCNC: 2.5 GM/DL
GLUCOSE SERPL-MCNC: 83 MG/DL (ref 65–99)
HCT VFR BLD AUTO: 36.4 % (ref 34–46.6)
HGB BLD-MCNC: 12 G/DL (ref 12–15.9)
LDH SERPL-CCNC: 129 U/L (ref 135–214)
MCH RBC QN AUTO: 27.6 PG (ref 26.6–33)
MCHC RBC AUTO-ENTMCNC: 33 G/DL (ref 31.5–35.7)
MCV RBC AUTO: 83.7 FL (ref 79–97)
PLATELET # BLD AUTO: 197 10*3/MM3 (ref 140–450)
PMV BLD AUTO: 10.4 FL (ref 6–12)
POTASSIUM SERPL-SCNC: 4 MMOL/L (ref 3.5–5.2)
PROT SERPL-MCNC: 6.1 G/DL (ref 6–8.5)
RBC # BLD AUTO: 4.35 10*6/MM3 (ref 3.77–5.28)
SODIUM SERPL-SCNC: 136 MMOL/L (ref 136–145)
URATE SERPL-MCNC: 3.4 MG/DL (ref 2.4–5.7)
WBC NRBC COR # BLD AUTO: 8.56 10*3/MM3 (ref 3.4–10.8)

## 2025-01-20 PROCEDURE — G0463 HOSPITAL OUTPT CLINIC VISIT: HCPCS

## 2025-01-20 PROCEDURE — 83615 LACTATE (LD) (LDH) ENZYME: CPT

## 2025-01-20 PROCEDURE — 85027 COMPLETE CBC AUTOMATED: CPT

## 2025-01-20 PROCEDURE — 80053 COMPREHEN METABOLIC PANEL: CPT

## 2025-01-20 PROCEDURE — G0378 HOSPITAL OBSERVATION PER HR: HCPCS

## 2025-01-20 PROCEDURE — 84550 ASSAY OF BLOOD/URIC ACID: CPT

## 2025-01-20 NOTE — PROGRESS NOTES
Reason for visit: Routine OB visit at 34w2d     CC:  She has been seeing some sparkles occasionally. Feeling well. Endorses appropriate fetal movement. Denies VB, LOF, contractions, h/a, and right upper quadrant pain.     ROS: All systems reviewed and are negative with exception of the following: visual changes, amenorrhea    Wt 124 kg (274 lb) lb for a TWG of 3.175 kg (7 lb), /104, FHTs 129, FH 34  Urine today and reviewed: - glucose, 2+ protein    Anatomy scan: EFW 64%; anterior placenta with no evidence of placenta previa.    Exam:  General Appearance:  No visualized signs of distress. Normal mood and behavior  Cardiorespiratory: HR str and reg. Lungs clear. Resp even and unlabored.  Abdomen: is soft and nontender. no CVA tenderness. Uterus is round and soft.  Ext: no edema. Calves non-tender.     Impression  Diagnoses and all orders for this visit:    1. 34 weeks gestation of pregnancy (Primary)  Discussed third trimester of pregnancy, discomforts and measures of support, fetal movement counts,  labor warnings, preeclampsia warnings, and signs and symptoms to report. Discussed plan for next visit to include cultures for GBS, chlamydia, and gonorrhea. Patient to come to the hospital or call for vaginal bleeding, leaking of fluid, regular or intense contractions, or other concerns. Signs and Symptoms of Labor and Alternative Pain Management During Labor and Delivery videos included in the AVS.    2. Primigravida, third trimester    3. Chronic hypertension affecting pregnancy  -     Protein / Creatinine Ratio, Urine - Urine, Clean Catch    4. Proteinuria affecting pregnancy in third trimester  -     Protein / Creatinine Ratio, Urine - Urine, Clean Catch            Sent to labor and labor and delivery for labs and monitoring.     Return to the office in 2 weeks for routine follow up with Dr Peralta and as needed with concerns.    This note has been signed electronically.  JASON Marks, RUPA

## 2025-01-21 LAB
CREAT UR-MCNC: 66 MG/DL
PROT UR-MCNC: 12.1 MG/DL
PROT/CREAT UR: 183 MG/G CREAT (ref 0–200)

## 2025-01-24 ENCOUNTER — ROUTINE PRENATAL (OUTPATIENT)
Age: 25
End: 2025-01-24
Payer: COMMERCIAL

## 2025-01-24 VITALS — BODY MASS INDEX: 41.97 KG/M2 | DIASTOLIC BLOOD PRESSURE: 78 MMHG | SYSTOLIC BLOOD PRESSURE: 120 MMHG | WEIGHT: 276 LBS

## 2025-01-24 DIAGNOSIS — O10.919 CHRONIC HYPERTENSION AFFECTING PREGNANCY: ICD-10-CM

## 2025-01-24 DIAGNOSIS — Z3A.34 34 WEEKS GESTATION OF PREGNANCY: Primary | ICD-10-CM

## 2025-01-24 DIAGNOSIS — Z29.11 NEED FOR RSV IMMUNIZATION: ICD-10-CM

## 2025-01-24 NOTE — PROGRESS NOTES
No complaints. GFM.  Denies VB, LOF, ctx.  Denies PIH symtpoms. BP has been normal at home. BPP 8/8. BP normal today. PCR normal last visit.     /78   Wt 125 kg (276 lb)   BMI 41.97 kg/m²    FHTs 132  Urine protein 1+, urine glucose negative  BPP 8/8, Normal TIMBO    Diagnoses and all orders for this visit:    1. 34 weeks gestation of pregnancy (Primary)  IUP at 34 weeks gestation, doing well. RTC weekly. BP stable without meds. PIH work up negative last visit.   2. Chronic hypertension affecting pregnancy

## 2025-01-28 NOTE — PROGRESS NOTES
Reason for visit: Routine OB visit at 35w4d     CC:  No urinary symptoms. She did have a small amount of bloody discharge yesterday. She has been having some pelvic pressure but does not feel as if she is in labor. Endorses appropriate fetal movement. Denies VB, LOF, contractions, h/a, visual changes, and right upper quadrant pain.     ROS: All systems reviewed and are negative with exception of the following: vaginal discharge, amenorrhea    Wt 125 kg (275 lb) lb for a TWG of 3.629 kg (8 lb), /78, FHTs 138, FH 37  Urine today and reviewed: - glucose, tr protein    32 week growth: EFW: 73%, 2209 g, AC: 79%, TIMBO: 9.6, DVP: 4.6, Vertex, anterior placenta  Anatomy scan: EFW 64%; anterior placenta with no evidence of placenta previa.    Exam:  General Appearance:  No visualized signs of distress. Normal mood and behavior  Cardiorespiratory: HR str and reg. Lungs clear. Resp even and unlabored.  Abdomen: is soft and nontender. no CVA tenderness. Uterus is round and soft.  Ext: tr edema. Calves non-tender.     Impression  Diagnoses and all orders for this visit:    1. 35 weeks gestation of pregnancy (Primary)  Discussed third trimester of pregnancy, discomforts and measures of support, fetal movement counts,  labor warnings, preeclampsia warnings, and signs and symptoms to report. Discussed microscopic blood in her urine and the fact that her UA was not a clean catch/she has been having some small amount of bloody vaginal discharge since yesterday. Negative for nitrites in urine. She does not believe she has a UTI, so we jointly decided there was no need for antibiotics.  Discussed plan for next visit to include cultures for GBS, chlamydia, and gonorrhea. Patient to come to the hospital or call for vaginal bleeding, leaking of fluid, regular or intense contractions, or other concerns. Signs and Symptoms of Labor and Alternative Pain Management During Labor and Delivery videos included in the AVS.    2.  Primigravida, third trimester    3. Chronic hypertension affecting pregnancy  Hypertension information in the AVS            Return to the office in 1 week for routine follow up with Dr Peralta and as needed with concerns.    This note has been signed electronically.  JASON Marks, RUPA

## 2025-01-29 ENCOUNTER — ROUTINE PRENATAL (OUTPATIENT)
Age: 25
End: 2025-01-29
Payer: COMMERCIAL

## 2025-01-29 VITALS — DIASTOLIC BLOOD PRESSURE: 78 MMHG | WEIGHT: 275 LBS | BODY MASS INDEX: 41.81 KG/M2 | SYSTOLIC BLOOD PRESSURE: 124 MMHG

## 2025-01-29 DIAGNOSIS — O10.919 CHRONIC HYPERTENSION AFFECTING PREGNANCY: ICD-10-CM

## 2025-01-29 DIAGNOSIS — Z3A.35 35 WEEKS GESTATION OF PREGNANCY: Primary | ICD-10-CM

## 2025-01-29 DIAGNOSIS — Z34.03 PRIMIGRAVIDA, THIRD TRIMESTER: ICD-10-CM

## 2025-02-03 ENCOUNTER — HOSPITAL ENCOUNTER (OUTPATIENT)
Facility: HOSPITAL | Age: 25
Discharge: HOME | End: 2025-02-03
Attending: OBSTETRICS & GYNECOLOGY | Admitting: OBSTETRICS & GYNECOLOGY
Payer: COMMERCIAL

## 2025-02-03 ENCOUNTER — ROUTINE PRENATAL (OUTPATIENT)
Age: 25
End: 2025-02-03
Payer: COMMERCIAL

## 2025-02-03 VITALS
WEIGHT: 276.6 LBS | BODY MASS INDEX: 41.92 KG/M2 | SYSTOLIC BLOOD PRESSURE: 137 MMHG | DIASTOLIC BLOOD PRESSURE: 81 MMHG | TEMPERATURE: 97.7 F | HEART RATE: 115 BPM | RESPIRATION RATE: 16 BRPM | HEIGHT: 68 IN

## 2025-02-03 VITALS — WEIGHT: 275.6 LBS | BODY MASS INDEX: 41.9 KG/M2 | DIASTOLIC BLOOD PRESSURE: 102 MMHG | SYSTOLIC BLOOD PRESSURE: 148 MMHG

## 2025-02-03 DIAGNOSIS — Z3A.36 36 WEEKS GESTATION OF PREGNANCY: Primary | ICD-10-CM

## 2025-02-03 DIAGNOSIS — O10.913 CHRONIC HYPERTENSION WITH EXACERBATION DURING PREGNANCY, THIRD TRIMESTER: ICD-10-CM

## 2025-02-03 LAB
ALBUMIN SERPL-MCNC: 3.5 G/DL (ref 3.5–5.2)
ALBUMIN/GLOB SERPL: 1.3 G/DL
ALP SERPL-CCNC: 104 U/L (ref 39–117)
ALT SERPL W P-5'-P-CCNC: 11 U/L (ref 1–33)
ANION GAP SERPL CALCULATED.3IONS-SCNC: 13 MMOL/L (ref 5–15)
AST SERPL-CCNC: 10 U/L (ref 1–32)
BASOPHILS # BLD AUTO: 0.02 10*3/MM3 (ref 0–0.2)
BASOPHILS NFR BLD AUTO: 0.2 % (ref 0–1.5)
BILIRUB SERPL-MCNC: <0.2 MG/DL (ref 0–1.2)
BUN SERPL-MCNC: 7 MG/DL (ref 6–20)
BUN/CREAT SERPL: 17.9 (ref 7–25)
CALCIUM SPEC-SCNC: 8.8 MG/DL (ref 8.6–10.5)
CHLORIDE SERPL-SCNC: 106 MMOL/L (ref 98–107)
CO2 SERPL-SCNC: 19 MMOL/L (ref 22–29)
CREAT SERPL-MCNC: 0.39 MG/DL (ref 0.57–1)
DEPRECATED RDW RBC AUTO: 42.5 FL (ref 37–54)
EGFRCR SERPLBLD CKD-EPI 2021: 141.9 ML/MIN/1.73
EOSINOPHIL # BLD AUTO: 0.06 10*3/MM3 (ref 0–0.4)
EOSINOPHIL NFR BLD AUTO: 0.6 % (ref 0.3–6.2)
ERYTHROCYTE [DISTWIDTH] IN BLOOD BY AUTOMATED COUNT: 13.8 % (ref 12.3–15.4)
GLOBULIN UR ELPH-MCNC: 2.7 GM/DL
GLUCOSE SERPL-MCNC: 109 MG/DL (ref 65–99)
HCT VFR BLD AUTO: 36 % (ref 34–46.6)
HGB BLD-MCNC: 12 G/DL (ref 12–15.9)
IMM GRANULOCYTES # BLD AUTO: 0.03 10*3/MM3 (ref 0–0.05)
IMM GRANULOCYTES NFR BLD AUTO: 0.3 % (ref 0–0.5)
LDH SERPL-CCNC: 137 U/L (ref 135–214)
LYMPHOCYTES # BLD AUTO: 2.68 10*3/MM3 (ref 0.7–3.1)
LYMPHOCYTES NFR BLD AUTO: 27.1 % (ref 19.6–45.3)
MCH RBC QN AUTO: 27.8 PG (ref 26.6–33)
MCHC RBC AUTO-ENTMCNC: 33.3 G/DL (ref 31.5–35.7)
MCV RBC AUTO: 83.5 FL (ref 79–97)
MONOCYTES # BLD AUTO: 0.48 10*3/MM3 (ref 0.1–0.9)
MONOCYTES NFR BLD AUTO: 4.9 % (ref 5–12)
NEUTROPHILS NFR BLD AUTO: 6.62 10*3/MM3 (ref 1.7–7)
NEUTROPHILS NFR BLD AUTO: 66.9 % (ref 42.7–76)
NRBC BLD AUTO-RTO: 0 /100 WBC (ref 0–0.2)
PLATELET # BLD AUTO: 231 10*3/MM3 (ref 140–450)
PMV BLD AUTO: 10.5 FL (ref 6–12)
POTASSIUM SERPL-SCNC: 3.7 MMOL/L (ref 3.5–5.2)
PROT SERPL-MCNC: 6.2 G/DL (ref 6–8.5)
RBC # BLD AUTO: 4.31 10*6/MM3 (ref 3.77–5.28)
SODIUM SERPL-SCNC: 138 MMOL/L (ref 136–145)
URATE SERPL-MCNC: 3.1 MG/DL (ref 2.4–5.7)
WBC NRBC COR # BLD AUTO: 9.89 10*3/MM3 (ref 3.4–10.8)

## 2025-02-03 PROCEDURE — 85025 COMPLETE CBC W/AUTO DIFF WBC: CPT | Performed by: OBSTETRICS & GYNECOLOGY

## 2025-02-03 PROCEDURE — 59025 FETAL NON-STRESS TEST: CPT

## 2025-02-03 PROCEDURE — G0463 HOSPITAL OUTPT CLINIC VISIT: HCPCS

## 2025-02-03 PROCEDURE — 84550 ASSAY OF BLOOD/URIC ACID: CPT | Performed by: OBSTETRICS & GYNECOLOGY

## 2025-02-03 PROCEDURE — 83615 LACTATE (LD) (LDH) ENZYME: CPT | Performed by: OBSTETRICS & GYNECOLOGY

## 2025-02-03 PROCEDURE — 80053 COMPREHEN METABOLIC PANEL: CPT | Performed by: OBSTETRICS & GYNECOLOGY

## 2025-02-03 PROCEDURE — G0378 HOSPITAL OBSERVATION PER HR: HCPCS

## 2025-02-03 RX ORDER — LABETALOL 100 MG/1
100 TABLET, FILM COATED ORAL 2 TIMES DAILY
Qty: 60 TABLET | Refills: 2 | Status: ON HOLD | OUTPATIENT
Start: 2025-02-03

## 2025-02-03 NOTE — NON STRESS TEST
Janiya Napier, a  at 36w2d with an KELLI of 3/1/2025, by Ultrasound, was seen at [facility] LABOR DELIVERY for a nonstress test.    No chief complaint on file.      Patient Active Problem List   Diagnosis    Family history of polyps in the colon    Constipation    History of hypertension    Body mass index (BMI) 40.0-44.9, adult    Pregnancy       Start Time: 1123  Stop Time: 1143    Interpretation A  Nonstress Test Interpretation A: Reactive (25 1206 : Taylor Juan, RN)  Comments A: Verified with BETHANIE Fulton (25 1206 : Taylor Juan, DONNA)

## 2025-02-03 NOTE — PROGRESS NOTES
Having HA yesterday and today. BP elevated today. Was normal at home yesterday. Increased swelling. UP trace.  GFM. Denies VB, LOF. ? Ctx.     BP (!) 148/102   Wt 125 kg (275 lb 9.6 oz)   BMI 41.90 kg/m²    FHTs 151  Urine protein trace, urine glucose negative  Cervix: FT/thick    Diagnoses and all orders for this visit:    1. 36 weeks gestation of pregnancy (Primary)  -     Strep B Screen - , Vaginal/Rectum  -     Chlamydia trachomatis, Neisseria gonorrhoeae, PCR w/ confirmation - Swab, Vagina  IUP at 36 weeks gestation, GBS today.   2. Chronic hypertension with exacerbation during pregnancy, third trimester  -     Protein / Creatinine Ratio, Urine - Urine, Clean Catch  CHTN now with elevated BP and HA.  To L&D for PIH work up. PCR to be sent from here. Start twice weekly visits with BPP.

## 2025-02-04 ENCOUNTER — TELEPHONE (OUTPATIENT)
Dept: OBSTETRICS AND GYNECOLOGY | Age: 25
End: 2025-02-04
Payer: COMMERCIAL

## 2025-02-04 LAB
CREAT UR-MCNC: 80 MG/DL
PROT UR-MCNC: 34.4 MG/DL
PROT/CREAT UR: 430 MG/G CREAT (ref 0–200)

## 2025-02-04 NOTE — TELEPHONE ENCOUNTER
Patient called office regarding her results from this morning. -PCR c/w superimposed preeclampsia.  Appt to see pt Thursday with BPP. Add US for growth to BPP.  Let pt know we will plan on induction at 37 weeks gestation. We will discuss that at next visit. - patient is wanting to know if she needs to be off of work. 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

## 2025-02-04 NOTE — PROGRESS NOTES
PCR c/w superimposed preeclampsia.  Appt to see pt Thursday with BPP. Add US for growth to BPP.  Let pt know we will plan on induction at 37 weeks gestation. We will discuss that at next visit.

## 2025-02-06 ENCOUNTER — ROUTINE PRENATAL (OUTPATIENT)
Age: 25
End: 2025-02-06
Payer: COMMERCIAL

## 2025-02-06 VITALS — BODY MASS INDEX: 41.81 KG/M2 | DIASTOLIC BLOOD PRESSURE: 88 MMHG | SYSTOLIC BLOOD PRESSURE: 122 MMHG | WEIGHT: 275 LBS

## 2025-02-06 DIAGNOSIS — O11.9 CHRONIC HYPERTENSION WITH SUPERIMPOSED PREECLAMPSIA: Primary | ICD-10-CM

## 2025-02-06 DIAGNOSIS — Z3A.36 36 WEEKS GESTATION OF PREGNANCY: ICD-10-CM

## 2025-02-06 RX ORDER — OXYTOCIN/0.9 % SODIUM CHLORIDE 30/500 ML
999 PLASTIC BAG, INJECTION (ML) INTRAVENOUS ONCE
Status: CANCELLED | OUTPATIENT
Start: 2025-02-06 | End: 2025-02-06

## 2025-02-06 RX ORDER — ONDANSETRON 4 MG/1
4 TABLET, ORALLY DISINTEGRATING ORAL EVERY 6 HOURS PRN
Status: CANCELLED | OUTPATIENT
Start: 2025-02-06

## 2025-02-06 RX ORDER — SODIUM CHLORIDE 9 MG/ML
40 INJECTION, SOLUTION INTRAVENOUS AS NEEDED
Status: CANCELLED | OUTPATIENT
Start: 2025-02-06

## 2025-02-06 RX ORDER — SODIUM CHLORIDE 0.9 % (FLUSH) 0.9 %
10 SYRINGE (ML) INJECTION EVERY 12 HOURS SCHEDULED
Status: CANCELLED | OUTPATIENT
Start: 2025-02-06

## 2025-02-06 RX ORDER — ACETAMINOPHEN 325 MG/1
650 TABLET ORAL EVERY 4 HOURS PRN
Status: CANCELLED | OUTPATIENT
Start: 2025-02-06

## 2025-02-06 RX ORDER — METHYLERGONOVINE MALEATE 0.2 MG/ML
200 INJECTION INTRAVENOUS ONCE AS NEEDED
Status: CANCELLED | OUTPATIENT
Start: 2025-02-06

## 2025-02-06 RX ORDER — HYDROMORPHONE HYDROCHLORIDE 1 MG/ML
1 INJECTION, SOLUTION INTRAMUSCULAR; INTRAVENOUS; SUBCUTANEOUS
Status: CANCELLED | OUTPATIENT
Start: 2025-02-06 | End: 2025-02-16

## 2025-02-06 RX ORDER — CARBOPROST TROMETHAMINE 250 UG/ML
250 INJECTION, SOLUTION INTRAMUSCULAR AS NEEDED
Status: CANCELLED | OUTPATIENT
Start: 2025-02-06

## 2025-02-06 RX ORDER — SODIUM CHLORIDE, SODIUM LACTATE, POTASSIUM CHLORIDE, CALCIUM CHLORIDE 600; 310; 30; 20 MG/100ML; MG/100ML; MG/100ML; MG/100ML
125 INJECTION, SOLUTION INTRAVENOUS CONTINUOUS
Status: CANCELLED | OUTPATIENT
Start: 2025-02-06 | End: 2025-02-06

## 2025-02-06 RX ORDER — ONDANSETRON 2 MG/ML
4 INJECTION INTRAMUSCULAR; INTRAVENOUS EVERY 6 HOURS PRN
Status: CANCELLED | OUTPATIENT
Start: 2025-02-06

## 2025-02-06 RX ORDER — BUTORPHANOL TARTRATE 1 MG/ML
1 INJECTION, SOLUTION INTRAMUSCULAR; INTRAVENOUS
Status: CANCELLED | OUTPATIENT
Start: 2025-02-06

## 2025-02-06 RX ORDER — SODIUM CHLORIDE 0.9 % (FLUSH) 0.9 %
10 SYRINGE (ML) INJECTION AS NEEDED
Status: CANCELLED | OUTPATIENT
Start: 2025-02-06

## 2025-02-06 RX ORDER — TERBUTALINE SULFATE 1 MG/ML
0.25 INJECTION, SOLUTION SUBCUTANEOUS AS NEEDED
Status: CANCELLED | OUTPATIENT
Start: 2025-02-06

## 2025-02-06 RX ORDER — CITRIC ACID/SODIUM CITRATE 334-500MG
30 SOLUTION, ORAL ORAL ONCE AS NEEDED
Status: CANCELLED | OUTPATIENT
Start: 2025-02-06

## 2025-02-06 RX ORDER — BUTORPHANOL TARTRATE 1 MG/ML
2 INJECTION, SOLUTION INTRAMUSCULAR; INTRAVENOUS
Status: CANCELLED | OUTPATIENT
Start: 2025-02-06

## 2025-02-06 RX ORDER — OXYTOCIN/0.9 % SODIUM CHLORIDE 30/500 ML
250 PLASTIC BAG, INJECTION (ML) INTRAVENOUS CONTINUOUS
Status: CANCELLED | OUTPATIENT
Start: 2025-02-06 | End: 2025-02-06

## 2025-02-06 RX ORDER — MISOPROSTOL 100 UG/1
25 TABLET ORAL
Status: CANCELLED | OUTPATIENT
Start: 2025-02-06 | End: 2025-02-06

## 2025-02-06 RX ORDER — MISOPROSTOL 100 UG/1
800 TABLET ORAL AS NEEDED
Status: CANCELLED | OUTPATIENT
Start: 2025-02-06

## 2025-02-06 RX ORDER — CALCIUM CARBONATE 500 MG/1
2 TABLET, CHEWABLE ORAL 3 TIMES DAILY PRN
Status: CANCELLED | OUTPATIENT
Start: 2025-02-06

## 2025-02-06 RX ORDER — IBUPROFEN 200 MG
600 TABLET ORAL EVERY 6 HOURS PRN
Status: CANCELLED | OUTPATIENT
Start: 2025-02-06

## 2025-02-06 NOTE — H&P
[facility]  Janiya Napier  : 2000  MRN: [id number]  CSN: [id number]    History and Physical    Subjective   Janiya Napier is a 24 y.o. year old  with an Estimated Date of Delivery: 3/1/25 scheduled for induction of labor on 2025 due to  chronic hypertension with superimposed preeclampsia .  Prenatal care has been with Dr. Kayla Peralta.  It has been complicated by chronic hypertension with superimposed pre-eclampsia.    OB History    Para Term  AB Living   1 0 0 0 0 0   SAB IAB Ectopic Molar Multiple Live Births   0 0 0 0 0 0      # Outcome Date GA Lbr Maurisio/2nd Weight Sex Type Anes PTL Lv   1 Current              Past Medical History:   Diagnosis Date    Acute appendicitis 2018    Anxiety     Family history of colon cancer     Family history of colonic polyps     Gastroesophageal reflux disease with esophagitis without hemorrhage 10/17/2022    Increased heartburn and reflux, despite taking TUMS daily.  Endoscopy 2022 showed LA grade B esophagitis.  Pantoprazole 40 mg daily started.     Anti-reflux measures were reviewed and discussed with patient.  They were advised to refrain from chocolate, alcohol, smoking, peppermint and caffeine.  They were advised to limit fatty foods, large meals, and eating late at nighttime.  They were advis    Hypertension     Nausea & vomiting 10/17/2022    Started 22 associated with diarrhea led to the ER visit as already described.  Diarrhea has resolved nausea vomiting persist.  Endoscopy 2022 showed LA B esophagitis.      PONV (postoperative nausea and vomiting)      Past Surgical History:   Procedure Laterality Date    APPENDECTOMY N/A 2018    Procedure: APPENDECTOMY LAPAROSCOPIC;  Surgeon: Nga Collier MD;  Location: [facility];  Service: General    CHOLECYSTECTOMY WITH INTRAOPERATIVE CHOLANGIOGRAM N/A 8/3/2020    Procedure: CHOLECYSTECTOMY LAPAROSCOPIC INTRAOPERATIVE CHOLANGIOGRAM;  Surgeon: Nando  Nga MALDONADO MD;  Location: [facility] OR;  Service: General;  Laterality: N/A;    COLONOSCOPY N/A 11/4/2022    Procedure: COLONOSCOPY WITH ANESTHESIA;  Surgeon: Betsy Hester MD;  Location: [facility] ENDOSCOPY;  Service: Gastroenterology;  Laterality: N/A;  pre: n/v/d/c  post: normal  Starla Mccray Y, APRN    COLPOSCOPY      ENDOSCOPY N/A 11/4/2022    Procedure: ESOPHAGOGASTRODUODENOSCOPY WITH ANESTHESIA;  Surgeon: Betsy Hester MD;  Location: [facility] ENDOSCOPY;  Service: Gastroenterology;  Laterality: N/A;  pre: n/v/d/c  post: esophagitis   Starla Mccray Y, APRN    SHOULDER SURGERY Right     x2    TONSILLECTOMY         Current Outpatient Medications:     aspirin 81 MG EC tablet, Take 1 tablet by mouth Daily., Disp: 30 tablet, Rfl: 11    docusate sodium (COLACE) 50 MG capsule, Take 1 capsule by mouth 2 (Two) Times a Day., Disp: , Rfl:     labetalol (NORMODYNE) 100 MG tablet, Take 1 tablet by mouth 2 (Two) Times a Day., Disp: 60 tablet, Rfl: 2    metoclopramide (Reglan) 10 MG tablet, Take 1 tablet by mouth 4 (Four) Times a Day As Needed (nausea)., Disp: 30 tablet, Rfl: 2    omeprazole OTC (PrilOSEC OTC) 20 MG EC tablet, Take 1 tablet by mouth Daily., Disp: 28 tablet, Rfl: 4    ondansetron ODT (ZOFRAN-ODT) 4 MG disintegrating tablet, Place 1 tablet on the tongue Every 6 (Six) Hours As Needed for Nausea or Vomiting., Disp: 120 tablet, Rfl: 2    polyethylene glycol (MIRALAX) 17 g packet, Take 17 g by mouth Daily., Disp: , Rfl:     prenatal vitamins (PRENATAL 27-1) 27-1 MG tablet tablet, Take 1 tablet by mouth Daily., Disp: 90 tablet, Rfl: 3    senna (SENOKOT) 8.6 MG tablet, Take 1 tablet by mouth Daily., Disp: , Rfl:     No Known Allergies  Social History    Tobacco Use      Smoking status: Never      Smokeless tobacco: Never    Review of Systems      Objective   /88   Wt 125 kg (275 lb)   BMI 41.81 kg/m²   General: well developed; well nourished  no acute distress  mentation appropriate   Heart: regular rate and  rhythm, S1, S2 normal, no murmur, click, rub or gallop   Lungs: breathing is unlabored   Abdomen:  Cervix:  Presentation:  EFW by Leopold's:  EFW by recent u/s: soft, non-tender; no masses  no umbilical or inguinal hernias are present  no hepato-splenomegaly  was not checked.  Vertex         Prenatal Labs  Lab Results   Component Value Date    HGB 12.0 2025    HEPBSAG Negative 2024    ABO O 2024    RH Negative 2024    ABSCRN Negative 2024    AKM8TNC6 Non Reactive 2024    URINECX Final report 2024       Recent Labs  Lab Results   Component Value Date    HGB 12.0 2025    HCT 36.0 2025    WBC 9.89 2025     2025           Assessment   IUP with an Estimated Date of Delivery: 3/1/25  Induction of labor scheduled on 2025 for  chronic HTN with superimposed preeclampsia .  The patient's pelvis feels clinically adequate for IOL to be appropriate, although she understands that this clinical judgement is not always accurate. EFW today 3406 g with normal TIMBO. Cervix unfavorable therefore will need cytotec for cervical ripening.   Group B Strep status: pending         Plan   Risks and benefits of induction discussed.  Patient understands that IOL increases the risk of  delivery over spontaneous labor, especially if the patient does not have a favorable cervix.  PCN for GBS prophylaxis  Anticipate .     Kayla Peralta MD  2025

## 2025-02-06 NOTE — H&P (VIEW-ONLY)
[facility]  Janiya Napier  : 2000  MRN: [id number]  CSN: [id number]    History and Physical    Subjective   Janiya Napier is a 24 y.o. year old  with an Estimated Date of Delivery: 3/1/25 scheduled for induction of labor on 2025 due to  chronic hypertension with superimposed preeclampsia .  Prenatal care has been with Dr. Kayla Peralta.  It has been complicated by chronic hypertension with superimposed pre-eclampsia.    OB History    Para Term  AB Living   1 0 0 0 0 0   SAB IAB Ectopic Molar Multiple Live Births   0 0 0 0 0 0      # Outcome Date GA Lbr Maurisio/2nd Weight Sex Type Anes PTL Lv   1 Current              Past Medical History:   Diagnosis Date    Acute appendicitis 2018    Anxiety     Family history of colon cancer     Family history of colonic polyps     Gastroesophageal reflux disease with esophagitis without hemorrhage 10/17/2022    Increased heartburn and reflux, despite taking TUMS daily.  Endoscopy 2022 showed LA grade B esophagitis.  Pantoprazole 40 mg daily started.     Anti-reflux measures were reviewed and discussed with patient.  They were advised to refrain from chocolate, alcohol, smoking, peppermint and caffeine.  They were advised to limit fatty foods, large meals, and eating late at nighttime.  They were advis    Hypertension     Nausea & vomiting 10/17/2022    Started 22 associated with diarrhea led to the ER visit as already described.  Diarrhea has resolved nausea vomiting persist.  Endoscopy 2022 showed LA B esophagitis.      PONV (postoperative nausea and vomiting)      Past Surgical History:   Procedure Laterality Date    APPENDECTOMY N/A 2018    Procedure: APPENDECTOMY LAPAROSCOPIC;  Surgeon: Nga Collier MD;  Location: [facility];  Service: General    CHOLECYSTECTOMY WITH INTRAOPERATIVE CHOLANGIOGRAM N/A 8/3/2020    Procedure: CHOLECYSTECTOMY LAPAROSCOPIC INTRAOPERATIVE CHOLANGIOGRAM;  Surgeon: Nando  Nga MALDONADO MD;  Location: [facility] OR;  Service: General;  Laterality: N/A;    COLONOSCOPY N/A 11/4/2022    Procedure: COLONOSCOPY WITH ANESTHESIA;  Surgeon: Betsy Hester MD;  Location: [facility] ENDOSCOPY;  Service: Gastroenterology;  Laterality: N/A;  pre: n/v/d/c  post: normal  Starla Mccray Y, APRN    COLPOSCOPY      ENDOSCOPY N/A 11/4/2022    Procedure: ESOPHAGOGASTRODUODENOSCOPY WITH ANESTHESIA;  Surgeon: Betsy Hester MD;  Location: [facility] ENDOSCOPY;  Service: Gastroenterology;  Laterality: N/A;  pre: n/v/d/c  post: esophagitis   Starla Mccray Y, APRN    SHOULDER SURGERY Right     x2    TONSILLECTOMY         Current Outpatient Medications:     aspirin 81 MG EC tablet, Take 1 tablet by mouth Daily., Disp: 30 tablet, Rfl: 11    docusate sodium (COLACE) 50 MG capsule, Take 1 capsule by mouth 2 (Two) Times a Day., Disp: , Rfl:     labetalol (NORMODYNE) 100 MG tablet, Take 1 tablet by mouth 2 (Two) Times a Day., Disp: 60 tablet, Rfl: 2    metoclopramide (Reglan) 10 MG tablet, Take 1 tablet by mouth 4 (Four) Times a Day As Needed (nausea)., Disp: 30 tablet, Rfl: 2    omeprazole OTC (PrilOSEC OTC) 20 MG EC tablet, Take 1 tablet by mouth Daily., Disp: 28 tablet, Rfl: 4    ondansetron ODT (ZOFRAN-ODT) 4 MG disintegrating tablet, Place 1 tablet on the tongue Every 6 (Six) Hours As Needed for Nausea or Vomiting., Disp: 120 tablet, Rfl: 2    polyethylene glycol (MIRALAX) 17 g packet, Take 17 g by mouth Daily., Disp: , Rfl:     prenatal vitamins (PRENATAL 27-1) 27-1 MG tablet tablet, Take 1 tablet by mouth Daily., Disp: 90 tablet, Rfl: 3    senna (SENOKOT) 8.6 MG tablet, Take 1 tablet by mouth Daily., Disp: , Rfl:     No Known Allergies  Social History    Tobacco Use      Smoking status: Never      Smokeless tobacco: Never    Review of Systems      Objective   /88   Wt 125 kg (275 lb)   BMI 41.81 kg/m²   General: well developed; well nourished  no acute distress  mentation appropriate   Heart: regular rate and  rhythm, S1, S2 normal, no murmur, click, rub or gallop   Lungs: breathing is unlabored   Abdomen:  Cervix:  Presentation:  EFW by Leopold's:  EFW by recent u/s: soft, non-tender; no masses  no umbilical or inguinal hernias are present  no hepato-splenomegaly  was not checked.  Vertex         Prenatal Labs  Lab Results   Component Value Date    HGB 12.0 2025    HEPBSAG Negative 2024    ABO O 2024    RH Negative 2024    ABSCRN Negative 2024    ALV3IQE1 Non Reactive 2024    URINECX Final report 2024       Recent Labs  Lab Results   Component Value Date    HGB 12.0 2025    HCT 36.0 2025    WBC 9.89 2025     2025           Assessment   IUP with an Estimated Date of Delivery: 3/1/25  Induction of labor scheduled on 2025 for  chronic HTN with superimposed preeclampsia .  The patient's pelvis feels clinically adequate for IOL to be appropriate, although she understands that this clinical judgement is not always accurate. EFW today 3406 g with normal TIMBO. Cervix unfavorable therefore will need cytotec for cervical ripening.   Group B Strep status: pending         Plan   Risks and benefits of induction discussed.  Patient understands that IOL increases the risk of  delivery over spontaneous labor, especially if the patient does not have a favorable cervix.  PCN for GBS prophylaxis  Anticipate .     Kayla Peralta MD  2025

## 2025-02-06 NOTE — PROGRESS NOTES
Only one episode of HA and flushing. BP has been normal at home.  GFM. BPP 8/8.  EFW today 2864 g, normal TIMBO.  PCR elevated c/w superimposed preeclampsia    /88   Wt 125 kg (275 lb)   BMI 41.81 kg/m²    FHTs 132  Urine protein trace, urine glucose negative    Diagnoses and all orders for this visit:    1. Chronic hypertension with superimposed preeclampsia (Primary)  IUP at 36 5/7 weeks gestation with CHTN/superimposed preeclampsia.  PCR elevated at last visit. BP stable on labetalol. Recommend delivery at 37 weeks which is 2/8.  Instructions given for induction. PIH precautions given.   2. 36 weeks gestation of pregnancy

## 2025-02-07 LAB
C TRACH RRNA SPEC QL NAA+PROBE: NEGATIVE
GP B STREP DNA SPEC QL NAA+PROBE: NEGATIVE
N GONORRHOEA RRNA SPEC QL NAA+PROBE: NEGATIVE

## 2025-02-08 ENCOUNTER — HOSPITAL ENCOUNTER (INPATIENT)
Facility: HOSPITAL | Age: 25
LOS: 4 days | Discharge: HOME | End: 2025-02-12
Attending: OBSTETRICS & GYNECOLOGY | Admitting: OBSTETRICS & GYNECOLOGY
Payer: COMMERCIAL

## 2025-02-08 ENCOUNTER — HOSPITAL ENCOUNTER (INPATIENT)
Dept: LABOR AND DELIVERY | Facility: HOSPITAL | Age: 25
Discharge: HOME | End: 2025-02-08
Payer: COMMERCIAL

## 2025-02-08 DIAGNOSIS — O11.9 CHRONIC HYPERTENSION WITH SUPERIMPOSED PREECLAMPSIA: ICD-10-CM

## 2025-02-08 LAB
ABO GROUP BLD: NORMAL
ALBUMIN SERPL-MCNC: 3.2 G/DL (ref 3.5–5.2)
ALBUMIN/GLOB SERPL: 1.4 G/DL
ALP SERPL-CCNC: 98 U/L (ref 39–117)
ALT SERPL W P-5'-P-CCNC: 8 U/L (ref 1–33)
ANION GAP SERPL CALCULATED.3IONS-SCNC: 13 MMOL/L (ref 5–15)
AST SERPL-CCNC: 10 U/L (ref 1–32)
BASOPHILS # BLD AUTO: 0.02 10*3/MM3 (ref 0–0.2)
BASOPHILS NFR BLD AUTO: 0.2 % (ref 0–1.5)
BILIRUB SERPL-MCNC: <0.2 MG/DL (ref 0–1.2)
BLD GP AB SCN SERPL QL: NEGATIVE
BUN SERPL-MCNC: 7 MG/DL (ref 6–20)
BUN/CREAT SERPL: 18.4 (ref 7–25)
CALCIUM SPEC-SCNC: 8.4 MG/DL (ref 8.6–10.5)
CHLORIDE SERPL-SCNC: 106 MMOL/L (ref 98–107)
CO2 SERPL-SCNC: 18 MMOL/L (ref 22–29)
CREAT SERPL-MCNC: 0.38 MG/DL (ref 0.57–1)
DEPRECATED RDW RBC AUTO: 42.6 FL (ref 37–54)
EGFRCR SERPLBLD CKD-EPI 2021: 142.8 ML/MIN/1.73
EOSINOPHIL # BLD AUTO: 0.06 10*3/MM3 (ref 0–0.4)
EOSINOPHIL NFR BLD AUTO: 0.7 % (ref 0.3–6.2)
ERYTHROCYTE [DISTWIDTH] IN BLOOD BY AUTOMATED COUNT: 13.9 % (ref 12.3–15.4)
GLOBULIN UR ELPH-MCNC: 2.3 GM/DL
GLUCOSE SERPL-MCNC: 134 MG/DL (ref 65–99)
HCT VFR BLD AUTO: 32.7 % (ref 34–46.6)
HGB BLD-MCNC: 10.8 G/DL (ref 12–15.9)
IMM GRANULOCYTES # BLD AUTO: 0.04 10*3/MM3 (ref 0–0.05)
IMM GRANULOCYTES NFR BLD AUTO: 0.5 % (ref 0–0.5)
LYMPHOCYTES # BLD AUTO: 2.9 10*3/MM3 (ref 0.7–3.1)
LYMPHOCYTES NFR BLD AUTO: 35.1 % (ref 19.6–45.3)
MCH RBC QN AUTO: 27.6 PG (ref 26.6–33)
MCHC RBC AUTO-ENTMCNC: 33 G/DL (ref 31.5–35.7)
MCV RBC AUTO: 83.6 FL (ref 79–97)
MONOCYTES # BLD AUTO: 0.46 10*3/MM3 (ref 0.1–0.9)
MONOCYTES NFR BLD AUTO: 5.6 % (ref 5–12)
NEUTROPHILS NFR BLD AUTO: 4.79 10*3/MM3 (ref 1.7–7)
NEUTROPHILS NFR BLD AUTO: 57.9 % (ref 42.7–76)
NRBC BLD AUTO-RTO: 0 /100 WBC (ref 0–0.2)
PLATELET # BLD AUTO: 200 10*3/MM3 (ref 140–450)
PMV BLD AUTO: 11.1 FL (ref 6–12)
POTASSIUM SERPL-SCNC: 3.7 MMOL/L (ref 3.5–5.2)
PROT SERPL-MCNC: 5.5 G/DL (ref 6–8.5)
RBC # BLD AUTO: 3.91 10*6/MM3 (ref 3.77–5.28)
RH BLD: NEGATIVE
SODIUM SERPL-SCNC: 137 MMOL/L (ref 136–145)
T&S EXPIRATION DATE: NORMAL
TREPONEMA PALLIDUM IGG+IGM AB [PRESENCE] IN SERUM OR PLASMA BY IMMUNOASSAY: NORMAL
WBC NRBC COR # BLD AUTO: 8.27 10*3/MM3 (ref 3.4–10.8)

## 2025-02-08 PROCEDURE — 86901 BLOOD TYPING SEROLOGIC RH(D): CPT | Performed by: OBSTETRICS & GYNECOLOGY

## 2025-02-08 PROCEDURE — 25810000003 LACTATED RINGERS SOLUTION: Performed by: OBSTETRICS & GYNECOLOGY

## 2025-02-08 PROCEDURE — 80053 COMPREHEN METABOLIC PANEL: CPT | Performed by: OBSTETRICS & GYNECOLOGY

## 2025-02-08 PROCEDURE — 63710000001 PROMETHAZINE PER 25 MG: Performed by: OBSTETRICS & GYNECOLOGY

## 2025-02-08 PROCEDURE — 25810000003 LACTATED RINGERS PER 1000 ML: Performed by: OBSTETRICS & GYNECOLOGY

## 2025-02-08 PROCEDURE — 86850 RBC ANTIBODY SCREEN: CPT | Performed by: OBSTETRICS & GYNECOLOGY

## 2025-02-08 PROCEDURE — 85025 COMPLETE CBC W/AUTO DIFF WBC: CPT | Performed by: OBSTETRICS & GYNECOLOGY

## 2025-02-08 PROCEDURE — 86900 BLOOD TYPING SEROLOGIC ABO: CPT | Performed by: OBSTETRICS & GYNECOLOGY

## 2025-02-08 PROCEDURE — 86780 TREPONEMA PALLIDUM: CPT | Performed by: OBSTETRICS & GYNECOLOGY

## 2025-02-08 RX ORDER — PENICILLIN G 3000000 [IU]/50ML
3 INJECTION, SOLUTION INTRAVENOUS EVERY 4 HOURS
Status: DISCONTINUED | OUTPATIENT
Start: 2025-02-08 | End: 2025-02-08

## 2025-02-08 RX ORDER — LABETALOL 200 MG/1
100 TABLET, FILM COATED ORAL EVERY 12 HOURS SCHEDULED
Status: DISPENSED | OUTPATIENT
Start: 2025-02-08 | End: 2025-02-11

## 2025-02-08 RX ORDER — CARBOPROST TROMETHAMINE 250 UG/ML
250 INJECTION, SOLUTION INTRAMUSCULAR AS NEEDED
Status: DISCONTINUED | OUTPATIENT
Start: 2025-02-08 | End: 2025-02-10 | Stop reason: HOSPADM

## 2025-02-08 RX ORDER — SODIUM CHLORIDE 9 MG/ML
40 INJECTION, SOLUTION INTRAVENOUS AS NEEDED
Status: DISCONTINUED | OUTPATIENT
Start: 2025-02-08 | End: 2025-02-10 | Stop reason: HOSPADM

## 2025-02-08 RX ORDER — ACETAMINOPHEN 325 MG/1
650 TABLET ORAL EVERY 4 HOURS PRN
Status: DISCONTINUED | OUTPATIENT
Start: 2025-02-08 | End: 2025-02-10 | Stop reason: HOSPADM

## 2025-02-08 RX ORDER — MISOPROSTOL 100 MCG
25 TABLET ORAL EVERY 4 HOURS
Status: COMPLETED | OUTPATIENT
Start: 2025-02-08 | End: 2025-02-08

## 2025-02-08 RX ORDER — BUTORPHANOL TARTRATE 2 MG/ML
1 INJECTION, SOLUTION INTRAMUSCULAR; INTRAVENOUS
Status: DISCONTINUED | OUTPATIENT
Start: 2025-02-08 | End: 2025-02-10 | Stop reason: HOSPADM

## 2025-02-08 RX ORDER — SODIUM CHLORIDE, SODIUM LACTATE, POTASSIUM CHLORIDE, CALCIUM CHLORIDE 600; 310; 30; 20 MG/100ML; MG/100ML; MG/100ML; MG/100ML
125 INJECTION, SOLUTION INTRAVENOUS CONTINUOUS
Status: DISPENSED | OUTPATIENT
Start: 2025-02-08 | End: 2025-02-08

## 2025-02-08 RX ORDER — OXYTOCIN/0.9 % SODIUM CHLORIDE 30/500 ML
2-6 PLASTIC BAG, INJECTION (ML) INTRAVENOUS
Status: DISCONTINUED | OUTPATIENT
Start: 2025-02-08 | End: 2025-02-09

## 2025-02-08 RX ORDER — SODIUM CHLORIDE, SODIUM LACTATE, POTASSIUM CHLORIDE, CALCIUM CHLORIDE 600; 310; 30; 20 MG/100ML; MG/100ML; MG/100ML; MG/100ML
125 INJECTION, SOLUTION INTRAVENOUS CONTINUOUS
Status: DISPENSED | OUTPATIENT
Start: 2025-02-08 | End: 2025-02-11

## 2025-02-08 RX ORDER — CITRIC ACID/SODIUM CITRATE 334-500MG
30 SOLUTION, ORAL ORAL ONCE AS NEEDED
Status: DISCONTINUED | OUTPATIENT
Start: 2025-02-08 | End: 2025-02-10 | Stop reason: HOSPADM

## 2025-02-08 RX ORDER — PROMETHAZINE HYDROCHLORIDE 25 MG/1
25 TABLET ORAL ONCE
Status: COMPLETED | OUTPATIENT
Start: 2025-02-08 | End: 2025-02-08

## 2025-02-08 RX ORDER — BUTORPHANOL TARTRATE 2 MG/ML
2 INJECTION, SOLUTION INTRAMUSCULAR; INTRAVENOUS
Status: DISCONTINUED | OUTPATIENT
Start: 2025-02-08 | End: 2025-02-10 | Stop reason: HOSPADM

## 2025-02-08 RX ORDER — ONDANSETRON 4 MG/1
4 TABLET, ORALLY DISINTEGRATING ORAL EVERY 6 HOURS PRN
Status: DISCONTINUED | OUTPATIENT
Start: 2025-02-08 | End: 2025-02-10 | Stop reason: SDUPTHER

## 2025-02-08 RX ORDER — SODIUM CHLORIDE 0.9 % (FLUSH) 0.9 %
10 SYRINGE (ML) INJECTION AS NEEDED
Status: DISCONTINUED | OUTPATIENT
Start: 2025-02-08 | End: 2025-02-10 | Stop reason: HOSPADM

## 2025-02-08 RX ORDER — ONDANSETRON 2 MG/ML
4 INJECTION INTRAMUSCULAR; INTRAVENOUS EVERY 6 HOURS PRN
Status: DISCONTINUED | OUTPATIENT
Start: 2025-02-08 | End: 2025-02-10 | Stop reason: SDUPTHER

## 2025-02-08 RX ORDER — TERBUTALINE SULFATE 1 MG/ML
0.25 INJECTION, SOLUTION SUBCUTANEOUS AS NEEDED
Status: DISCONTINUED | OUTPATIENT
Start: 2025-02-08 | End: 2025-02-10 | Stop reason: HOSPADM

## 2025-02-08 RX ORDER — MISOPROSTOL 200 UG/1
800 TABLET ORAL AS NEEDED
Status: DISCONTINUED | OUTPATIENT
Start: 2025-02-08 | End: 2025-02-10 | Stop reason: HOSPADM

## 2025-02-08 RX ORDER — SODIUM CHLORIDE 0.9 % (FLUSH) 0.9 %
10 SYRINGE (ML) INJECTION EVERY 12 HOURS SCHEDULED
Status: DISCONTINUED | OUTPATIENT
Start: 2025-02-08 | End: 2025-02-10 | Stop reason: HOSPADM

## 2025-02-08 RX ORDER — METHYLERGONOVINE MALEATE 0.2 MG/ML
200 INJECTION INTRAVENOUS ONCE AS NEEDED
Status: DISCONTINUED | OUTPATIENT
Start: 2025-02-08 | End: 2025-02-10 | Stop reason: HOSPADM

## 2025-02-08 RX ADMIN — SODIUM CHLORIDE, POTASSIUM CHLORIDE, SODIUM LACTATE AND CALCIUM CHLORIDE 125 ML/HR: 600; 310; 30; 20 INJECTION, SOLUTION INTRAVENOUS at 22:28

## 2025-02-08 RX ADMIN — SODIUM CHLORIDE, POTASSIUM CHLORIDE, SODIUM LACTATE AND CALCIUM CHLORIDE 125 ML/HR: 600; 310; 30; 20 INJECTION, SOLUTION INTRAVENOUS at 06:31

## 2025-02-08 RX ADMIN — Medication 25 MCG: at 07:17

## 2025-02-08 RX ADMIN — Medication 25 MCG: at 16:08

## 2025-02-08 RX ADMIN — LABETALOL HYDROCHLORIDE 100 MG: 200 TABLET, FILM COATED ORAL at 17:49

## 2025-02-08 RX ADMIN — Medication 2 MILLI-UNITS/MIN: at 20:56

## 2025-02-08 RX ADMIN — ACETAMINOPHEN 650 MG: 325 TABLET ORAL at 22:24

## 2025-02-08 RX ADMIN — SODIUM CHLORIDE, POTASSIUM CHLORIDE, SODIUM LACTATE AND CALCIUM CHLORIDE 125 ML/HR: 600; 310; 30; 20 INJECTION, SOLUTION INTRAVENOUS at 10:45

## 2025-02-08 RX ADMIN — PROMETHAZINE HYDROCHLORIDE 25 MG: 25 TABLET ORAL at 22:24

## 2025-02-08 RX ADMIN — Medication 25 MCG: at 11:09

## 2025-02-08 NOTE — LACTATION NOTE
"Mother's Name: Janiya Phone #: [phone]  Infant Name: Sheila Camara :  Gestation:  Day of life:  Birth weight:    Discharge weight:  Weight Loss:   24 hour Summary of Feeds:  Voids:  Stools:  Assistive devices (shields, shells, etc):  Significant Maternal history: , Anxiety, CHTN  Maternal Concerns:    Maternal Goal: \"Try to breastfeed but fine with formula feeding too\"  Mother's Medications: PNV, Miralax, Zofran, Prilosec, Reglan, Labetalol, Colace, ASA  Breastpump for home: Spectra  Ped follow up appt:    Visit in LDR with patient who plans to breastfeed. Gave and reviewed initial packet. Breastfeeding book provided as well. Recommended watching educational videos. Questions denied.     Instructed patient our lactation team is here for continued support throughout their breastfeeding journey. Our team has encouraged patient to call with any questions or concerns that may arise after discharge.    Breastfeeding and Diaper Chart  Check List for Essentials of Positioning And Latch-on handout provided by [employer]  Hand Expression handout provided by [employer]  Five Keys to Successful Breastfeeding handout provided by [employer]    The Many Benefits if Breastfeeding handout given  Breastfeeding saves time  *Breastfeeding allows you to calm or feed your baby immediately, which leads to a happier baby who cries less  *There is nothing to buy, prepare, or maintain.There is nothing to clean or sterilize.  Breastfeeding builds a mothers confidence  *She knows all her baby needs to thrive is her!  Breastfeeding saves Money  *There is no formula to buy and healthier breast fed babies have less medical costs  Healthy Mom/Healthy baby  * babies get sick less often, and when they do they are usually sick less severely and for a shorter time  * babies have fewer ear infections  * babies have fewer allergies  *Mothers who breastfeed " have a lower risk for cancer, osteoporosis, anemia, high blood pressure, obesity, and Type ll diabetes  *Mothers miss less work days with sick babies  Breast fed babies have a better dental health  * babies have better jaw development which requires lest orthodontic work  *Breast milk does not promote cavities  * babies can nurse at night without worry of tooth decay  Breastfeeding allows a baby to reach his full IQ potential  *The longer a baby is breast fed, the better their brain development  Breast fed babies and moms are more relaxed  *The hormones released during breastfeeding have a calming effect on mothers  *Breastfeeding requires mom to take a break; this may help mom get more rest after delivery  *Breastfeeding is quicker than preparing formula which allows mom and baby to get back to sleep faster  *Breastfeeding promotes bonding and allows mom to learn babies cues and care needs more quickly  Breastfeeding cleanup is easier  *The bowel movements and spit up of breast fed babies doesn't smell as bad  *Spit-up of breast fed babies doesn't stain clothing  Getting out of the hourse is easier  *No formula bottles to prepare and carry safely   *No time restraints due to worry about what baby will eat  *No worries about warming a bottle or finding safe water to prepare bottles  Breastfeeding mother get their bodies back sooner  *The uterus shrinks more quickly and completely, which allows a flatter tummy  *Breastfeeding burns 400-500 calories a day; making milk torches stored fat!  Breastfeeding is better for the environment  *There is no trash to dispose of after breastfeeding  *There is no production facility to produce breast milk; moms body does it all without the pollution of a factory    Your Guide to Breastfeeding Booklet by [employer], www.[employer]trip    Safe Storage of Breastmilk magnet: [employer]

## 2025-02-09 PROCEDURE — 3E033VJ INTRODUCTION OF OTHER HORMONE INTO PERIPHERAL VEIN, PERCUTANEOUS APPROACH: ICD-10-PCS | Performed by: OBSTETRICS & GYNECOLOGY

## 2025-02-09 PROCEDURE — 25810000003 LACTATED RINGERS PER 1000 ML: Performed by: OBSTETRICS & GYNECOLOGY

## 2025-02-09 RX ORDER — OXYTOCIN/0.9 % SODIUM CHLORIDE 30/500 ML
2-20 PLASTIC BAG, INJECTION (ML) INTRAVENOUS
Status: DISPENSED | OUTPATIENT
Start: 2025-02-09 | End: 2025-02-11

## 2025-02-09 RX ADMIN — Medication 2 MILLI-UNITS/MIN: at 18:15

## 2025-02-09 RX ADMIN — SODIUM CHLORIDE, POTASSIUM CHLORIDE, SODIUM LACTATE AND CALCIUM CHLORIDE 125 ML/HR: 600; 310; 30; 20 INJECTION, SOLUTION INTRAVENOUS at 11:06

## 2025-02-09 RX ADMIN — LABETALOL HYDROCHLORIDE 100 MG: 200 TABLET, FILM COATED ORAL at 18:15

## 2025-02-09 RX ADMIN — LABETALOL HYDROCHLORIDE 100 MG: 200 TABLET, FILM COATED ORAL at 06:10

## 2025-02-09 NOTE — PLAN OF CARE
Problem: Adult Inpatient Plan of Care  Goal: Plan of Care Review  Outcome: Progressing  Flowsheets (Taken 2/8/2025 1845)  Progress: no change  Outcome Evaluation: pt 0/50. LR at 125ml/hour. pt walking the cruz. Currently on cytotec.  Plan of Care Reviewed With: patient  Goal: Patient-Specific Goal (Individualized)  Outcome: Progressing  Goal: Absence of Hospital-Acquired Illness or Injury  Outcome: Progressing  Intervention: Identify and Manage Fall Risk  Recent Flowsheet Documentation  Taken 2/8/2025 0720 by Melissa Kam RN  Safety Promotion/Fall Prevention: safety round/check completed  Intervention: Prevent Skin Injury  Recent Flowsheet Documentation  Taken 2/8/2025 0720 by Melissa Kam RN  Body Position: supine  Goal: Optimal Comfort and Wellbeing  Outcome: Progressing  Intervention: Provide Person-Centered Care  Recent Flowsheet Documentation  Taken 2/8/2025 0720 by Melissa Kam RN  Trust Relationship/Rapport:   care explained   questions answered   questions encouraged   thoughts/feelings acknowledged  Goal: Readiness for Transition of Care  Outcome: Progressing   Goal Outcome Evaluation:  Plan of Care Reviewed With: patient        Progress: no change  Outcome Evaluation: pt 0/50. LR at 125ml/hour. pt walking the cruz. Currently on cytotec.

## 2025-02-09 NOTE — OBED NOTES
[facility]  Janiya Napier  : 2000  MRN: [id number]  CSN: [id number]    Labor progress note    Subjective   Patient currently reports is having no problems.  Patient denies any pain.  No LOF or VB.  She has now had four doses of cytotec     Objective   Min/max vitals past 24 hours:  Temp  Min: 98 °F (36.7 °C)  Max: 98.5 °F (36.9 °C)   BP  Min: 118/82  Max: 140/84   Pulse  Min: 77  Max: 104   Resp  Min: 16  Max: 18        FHT's: reactive, reassuring  category 1.    125 + accels, no decels, moderate variability   Cervix: was checked (by me): 0 cm / 70 % / -3.  Soft, mid position   Contractions: occasional          Assessment   IUP at 37w0d, IOL for preeclampsia  GBS negative  Fetus reassuring  Starting to make cervical change, although still closed it is now soft and thinned 70%     Plan   Switching to low-dose pitocin for overnight  Will attempt AROM in the AM  Patient without questions or concerns.    Teresa Dave MD  2025  20:20 CST

## 2025-02-09 NOTE — PLAN OF CARE
Goal Outcome Evaluation:           Progress: improving  Outcome Evaluation: Pt 1-2/70/-2   LR at 125 mL/hr  Patient has rested well during the night. Pitocin currently infusing.  AROM at 0515    All vitals are within normal limits,  Pt encouraged to walk and move around during labor.

## 2025-02-09 NOTE — PROGRESS NOTES
Sherman Oakspreet Napier  : 2000  MRN: [id number]  CSN: [id number]    Labor progress note    Subjective   Patient currently reports is having no problems.  Patient slept well overnight on low-dose pitocin.  She is only occasionally feeling mild contractions.  No LOF or VB.     Objective   Min/max vitals past 24 hours:  Temp  Min: 98 °F (36.7 °C)  Max: 98.6 °F (37 °C)   BP  Min: 118/82  Max: 143/78   Pulse  Min: 77  Max: 104   Resp  Min: 16  Max: 18        FHT's: reactive, reassuring  120 +accels, no decels, moderate variability   Cervix: was checked (by me): 1-2 cm / 70 % / -3.  Mid station.  AROM with scant clear fluid   Contractions: Difficult to trace          Assessment   IUP at 37w1d  IOL for preeclampsia: patient starting to make change now.  BP elevated sometimes, but not enough to need prn medications from hypertensive emergency protocol.  Patient still getting labetalol 100 mg bid  GBS negative  Fetus reassuring     Plan   Continue with induction  Start going up on pitocin now, per protocol  AROM now, clear fluid.    Teresa Dave MD  2025  05:18 CST

## 2025-02-10 ENCOUNTER — ANESTHESIA EVENT (OUTPATIENT)
Dept: LABOR AND DELIVERY | Facility: HOSPITAL | Age: 25
End: 2025-02-10
Payer: COMMERCIAL

## 2025-02-10 ENCOUNTER — ANESTHESIA (OUTPATIENT)
Dept: LABOR AND DELIVERY | Facility: HOSPITAL | Age: 25
End: 2025-02-10
Payer: COMMERCIAL

## 2025-02-10 PROCEDURE — 25010000002 ROPIVACAINE PER 1 MG: Performed by: NURSE ANESTHETIST, CERTIFIED REGISTERED

## 2025-02-10 PROCEDURE — 25010000002 LIDOCAINE PF 2% 2 % SOLUTION: Performed by: NURSE ANESTHETIST, CERTIFIED REGISTERED

## 2025-02-10 PROCEDURE — 25810000003 LACTATED RINGERS PER 1000 ML: Performed by: OBSTETRICS & GYNECOLOGY

## 2025-02-10 PROCEDURE — 59400 OBSTETRICAL CARE: CPT | Performed by: OBSTETRICS & GYNECOLOGY

## 2025-02-10 PROCEDURE — 25010000002 LIDOCAINE 2% SOLUTION: Performed by: OBSTETRICS & GYNECOLOGY

## 2025-02-10 PROCEDURE — 25010000002 ONDANSETRON PER 1 MG: Performed by: OBSTETRICS & GYNECOLOGY

## 2025-02-10 PROCEDURE — 25010000002 LIDOCAINE 2% SOLUTION

## 2025-02-10 PROCEDURE — C1755 CATHETER, INTRASPINAL: HCPCS | Performed by: NURSE ANESTHETIST, CERTIFIED REGISTERED

## 2025-02-10 PROCEDURE — 25010000002 BUTORPHANOL PER 1 MG: Performed by: OBSTETRICS & GYNECOLOGY

## 2025-02-10 PROCEDURE — 0KQM0ZZ REPAIR PERINEUM MUSCLE, OPEN APPROACH: ICD-10-PCS | Performed by: OBSTETRICS & GYNECOLOGY

## 2025-02-10 RX ORDER — OXYTOCIN/0.9 % SODIUM CHLORIDE 30/500 ML
999 PLASTIC BAG, INJECTION (ML) INTRAVENOUS ONCE
Status: DISCONTINUED | OUTPATIENT
Start: 2025-02-10 | End: 2025-02-10 | Stop reason: HOSPADM

## 2025-02-10 RX ORDER — NALOXONE HCL 0.4 MG/ML
0.4 VIAL (ML) INJECTION
Status: DISCONTINUED | OUTPATIENT
Start: 2025-02-10 | End: 2025-02-12 | Stop reason: HOSPADM

## 2025-02-10 RX ORDER — ROPIVACAINE HYDROCHLORIDE 5 MG/ML
INJECTION, SOLUTION EPIDURAL; INFILTRATION; PERINEURAL AS NEEDED
Status: DISCONTINUED | OUTPATIENT
Start: 2025-02-10 | End: 2025-02-10 | Stop reason: SURG

## 2025-02-10 RX ORDER — HYDROXYZINE HYDROCHLORIDE 25 MG/1
50 TABLET, FILM COATED ORAL NIGHTLY PRN
Status: DISCONTINUED | OUTPATIENT
Start: 2025-02-10 | End: 2025-02-12 | Stop reason: HOSPADM

## 2025-02-10 RX ORDER — PRENATAL VIT/IRON FUM/FOLIC AC 27MG-0.8MG
1 TABLET ORAL DAILY
Status: DISCONTINUED | OUTPATIENT
Start: 2025-02-10 | End: 2025-02-12 | Stop reason: HOSPADM

## 2025-02-10 RX ORDER — LIDOCAINE HYDROCHLORIDE AND EPINEPHRINE 15; 5 MG/ML; UG/ML
INJECTION, SOLUTION EPIDURAL
Status: COMPLETED | OUTPATIENT
Start: 2025-02-10 | End: 2025-02-10

## 2025-02-10 RX ORDER — IBUPROFEN 600 MG/1
600 TABLET, FILM COATED ORAL EVERY 6 HOURS PRN
Status: DISCONTINUED | OUTPATIENT
Start: 2025-02-10 | End: 2025-02-12 | Stop reason: HOSPADM

## 2025-02-10 RX ORDER — DOCUSATE SODIUM 100 MG/1
100 CAPSULE, LIQUID FILLED ORAL 2 TIMES DAILY
Status: DISCONTINUED | OUTPATIENT
Start: 2025-02-10 | End: 2025-02-12 | Stop reason: HOSPADM

## 2025-02-10 RX ORDER — MORPHINE SULFATE 2 MG/ML
1 INJECTION, SOLUTION INTRAMUSCULAR; INTRAVENOUS EVERY 4 HOURS PRN
Status: DISCONTINUED | OUTPATIENT
Start: 2025-02-10 | End: 2025-02-12

## 2025-02-10 RX ORDER — PROMETHAZINE HYDROCHLORIDE 12.5 MG/1
12.5 SUPPOSITORY RECTAL EVERY 6 HOURS PRN
Status: DISCONTINUED | OUTPATIENT
Start: 2025-02-10 | End: 2025-02-12 | Stop reason: HOSPADM

## 2025-02-10 RX ORDER — SODIUM CHLORIDE 0.9 % (FLUSH) 0.9 %
1-10 SYRINGE (ML) INJECTION AS NEEDED
Status: DISCONTINUED | OUTPATIENT
Start: 2025-02-10 | End: 2025-02-12 | Stop reason: HOSPADM

## 2025-02-10 RX ORDER — ONDANSETRON 2 MG/ML
4 INJECTION INTRAMUSCULAR; INTRAVENOUS EVERY 6 HOURS PRN
Status: DISCONTINUED | OUTPATIENT
Start: 2025-02-10 | End: 2025-02-10 | Stop reason: HOSPADM

## 2025-02-10 RX ORDER — LIDOCAINE HYDROCHLORIDE 20 MG/ML
INJECTION, SOLUTION EPIDURAL; INFILTRATION; INTRACAUDAL; PERINEURAL AS NEEDED
Status: DISCONTINUED | OUTPATIENT
Start: 2025-02-10 | End: 2025-02-10 | Stop reason: SURG

## 2025-02-10 RX ORDER — CALCIUM CARBONATE 500 MG/1
2 TABLET, CHEWABLE ORAL 3 TIMES DAILY PRN
Status: DISCONTINUED | OUTPATIENT
Start: 2025-02-10 | End: 2025-02-12 | Stop reason: HOSPADM

## 2025-02-10 RX ORDER — BISACODYL 10 MG
10 SUPPOSITORY, RECTAL RECTAL DAILY PRN
Status: DISCONTINUED | OUTPATIENT
Start: 2025-02-11 | End: 2025-02-12 | Stop reason: HOSPADM

## 2025-02-10 RX ORDER — OXYTOCIN/0.9 % SODIUM CHLORIDE 30/500 ML
125 PLASTIC BAG, INJECTION (ML) INTRAVENOUS ONCE AS NEEDED
Status: DISCONTINUED | OUTPATIENT
Start: 2025-02-10 | End: 2025-02-12 | Stop reason: HOSPADM

## 2025-02-10 RX ORDER — LIDOCAINE HYDROCHLORIDE AND EPINEPHRINE 15; 5 MG/ML; UG/ML
INJECTION, SOLUTION EPIDURAL AS NEEDED
Status: DISCONTINUED | OUTPATIENT
Start: 2025-02-10 | End: 2025-02-10 | Stop reason: SURG

## 2025-02-10 RX ORDER — MISOPROSTOL 200 UG/1
600 TABLET ORAL ONCE AS NEEDED
Status: DISCONTINUED | OUTPATIENT
Start: 2025-02-10 | End: 2025-02-12 | Stop reason: HOSPADM

## 2025-02-10 RX ORDER — LIDOCAINE HYDROCHLORIDE 20 MG/ML
20 INJECTION, SOLUTION INFILTRATION; PERINEURAL ONCE AS NEEDED
Status: COMPLETED | OUTPATIENT
Start: 2025-02-10 | End: 2025-02-10

## 2025-02-10 RX ORDER — ONDANSETRON 4 MG/1
4 TABLET, ORALLY DISINTEGRATING ORAL EVERY 6 HOURS PRN
Status: DISCONTINUED | OUTPATIENT
Start: 2025-02-10 | End: 2025-02-10 | Stop reason: HOSPADM

## 2025-02-10 RX ORDER — IBUPROFEN 600 MG/1
600 TABLET, FILM COATED ORAL EVERY 6 HOURS PRN
Status: DISCONTINUED | OUTPATIENT
Start: 2025-02-10 | End: 2025-02-10 | Stop reason: HOSPADM

## 2025-02-10 RX ORDER — ONDANSETRON 2 MG/ML
4 INJECTION INTRAMUSCULAR; INTRAVENOUS EVERY 6 HOURS PRN
Status: DISCONTINUED | OUTPATIENT
Start: 2025-02-10 | End: 2025-02-12 | Stop reason: HOSPADM

## 2025-02-10 RX ORDER — HYDROCORTISONE 25 MG/G
1 CREAM TOPICAL AS NEEDED
Status: DISCONTINUED | OUTPATIENT
Start: 2025-02-10 | End: 2025-02-12 | Stop reason: HOSPADM

## 2025-02-10 RX ORDER — OXYTOCIN/0.9 % SODIUM CHLORIDE 30/500 ML
250 PLASTIC BAG, INJECTION (ML) INTRAVENOUS CONTINUOUS
Status: ACTIVE | OUTPATIENT
Start: 2025-02-10 | End: 2025-02-10

## 2025-02-10 RX ORDER — TRAMADOL HYDROCHLORIDE 50 MG/1
50 TABLET ORAL EVERY 6 HOURS PRN
Status: DISCONTINUED | OUTPATIENT
Start: 2025-02-10 | End: 2025-02-12 | Stop reason: HOSPADM

## 2025-02-10 RX ORDER — LIDOCAINE HYDROCHLORIDE 20 MG/ML
INJECTION, SOLUTION INFILTRATION; PERINEURAL
Status: COMPLETED
Start: 2025-02-10 | End: 2025-02-10

## 2025-02-10 RX ORDER — METHYLERGONOVINE MALEATE 0.2 MG/ML
200 INJECTION INTRAVENOUS ONCE AS NEEDED
Status: DISCONTINUED | OUTPATIENT
Start: 2025-02-10 | End: 2025-02-12 | Stop reason: HOSPADM

## 2025-02-10 RX ORDER — ONDANSETRON 4 MG/1
4 TABLET, ORALLY DISINTEGRATING ORAL EVERY 8 HOURS PRN
Status: DISCONTINUED | OUTPATIENT
Start: 2025-02-10 | End: 2025-02-12 | Stop reason: HOSPADM

## 2025-02-10 RX ORDER — CALCIUM CARBONATE 500 MG/1
2 TABLET, CHEWABLE ORAL 3 TIMES DAILY PRN
Status: DISCONTINUED | OUTPATIENT
Start: 2025-02-10 | End: 2025-02-10 | Stop reason: HOSPADM

## 2025-02-10 RX ORDER — PROMETHAZINE HYDROCHLORIDE 25 MG/1
25 TABLET ORAL EVERY 6 HOURS PRN
Status: DISCONTINUED | OUTPATIENT
Start: 2025-02-10 | End: 2025-02-12 | Stop reason: HOSPADM

## 2025-02-10 RX ORDER — ACETAMINOPHEN 325 MG/1
650 TABLET ORAL EVERY 6 HOURS PRN
Status: DISCONTINUED | OUTPATIENT
Start: 2025-02-10 | End: 2025-02-12 | Stop reason: HOSPADM

## 2025-02-10 RX ADMIN — PRENATAL VIT W/ FE FUMARATE-FA TAB 27-0.8 MG 1 TABLET: 27-0.8 TAB at 11:19

## 2025-02-10 RX ADMIN — Medication 999 MILLI-UNITS/MIN: at 05:20

## 2025-02-10 RX ADMIN — LIDOCAINE HYDROCHLORIDE 20 ML: 20 INJECTION, SOLUTION INFILTRATION; PERINEURAL at 05:20

## 2025-02-10 RX ADMIN — LIDOCAINE HYDROCHLORIDE AND EPINEPHRINE 3 ML: 15; 5 INJECTION, SOLUTION EPIDURAL at 04:35

## 2025-02-10 RX ADMIN — ROPIVACAINE HYDROCHLORIDE 10 ML/HR: 2 INJECTION, SOLUTION EPIDURAL; INFILTRATION at 03:16

## 2025-02-10 RX ADMIN — DOCUSATE SODIUM 100 MG: 100 CAPSULE, LIQUID FILLED ORAL at 11:19

## 2025-02-10 RX ADMIN — LABETALOL HYDROCHLORIDE 100 MG: 200 TABLET, FILM COATED ORAL at 06:13

## 2025-02-10 RX ADMIN — IBUPROFEN 600 MG: 600 TABLET, FILM COATED ORAL at 18:14

## 2025-02-10 RX ADMIN — LIDOCAINE HYDROCHLORIDE 20 ML: 20 INJECTION, SOLUTION INFILTRATION; PERINEURAL at 05:16

## 2025-02-10 RX ADMIN — ONDANSETRON 4 MG: 2 INJECTION INTRAMUSCULAR; INTRAVENOUS at 02:57

## 2025-02-10 RX ADMIN — ACETAMINOPHEN 650 MG: 325 TABLET ORAL at 11:19

## 2025-02-10 RX ADMIN — SODIUM CHLORIDE, POTASSIUM CHLORIDE, SODIUM LACTATE AND CALCIUM CHLORIDE 999 ML/HR: 600; 310; 30; 20 INJECTION, SOLUTION INTRAVENOUS at 03:09

## 2025-02-10 RX ADMIN — LIDOCAINE HYDROCHLORIDE 5 ML: 20 INJECTION, SOLUTION EPIDURAL; INFILTRATION; INTRACAUDAL; PERINEURAL at 03:11

## 2025-02-10 RX ADMIN — DOCUSATE SODIUM 100 MG: 100 CAPSULE, LIQUID FILLED ORAL at 20:28

## 2025-02-10 RX ADMIN — LIDOCAINE HYDROCHLORIDE AND EPINEPHRINE 2 ML: 15; 5 INJECTION, SOLUTION EPIDURAL at 03:11

## 2025-02-10 RX ADMIN — ROPIVACAINE HYDROCHLORIDE 10 ML: 5 INJECTION EPIDURAL; INFILTRATION; PERINEURAL at 04:39

## 2025-02-10 RX ADMIN — Medication: at 20:28

## 2025-02-10 RX ADMIN — BUTORPHANOL TARTRATE 2 MG: 2 INJECTION, SOLUTION INTRAMUSCULAR; INTRAVENOUS at 00:08

## 2025-02-10 RX ADMIN — LIDOCAINE HYDROCHLORIDE AND EPINEPHRINE 3 ML: 15; 5 INJECTION, SOLUTION EPIDURAL at 03:08

## 2025-02-10 NOTE — PAYOR COMM NOTE
"ADMIT 2025  MATERNITY  REF:[id number]    [facility]  JAVED,    [phone]  OR   FAX    [phone]    Karthikeyan Janiya Shepard (24 y.o. Female)       Date of Birth   2000    Social Security Number       Address   [address] [place] [address]    Home Phone   [phone]    MRN   [id number]       Mandaeism   [facility]    Marital Status                               Admission Date   25    Admission Type   Elective    Admitting Provider   Kayla Peralta MD    Attending Provider   Kayla Peralta MD    Department, Room/Bed   [facility] MOTHER BABY 2A, M240/1       Discharge Date       Discharge Disposition       Discharge Destination                                 Attending Provider: Kayla Peralta MD    Allergies: No Known Allergies    Isolation: None   Infection: None   Code Status: CPR    Ht: 172.7 cm (68\")   Wt: 128 kg (281 lb 3.2 oz)    Admission Cmt: None   Principal Problem: Chronic hypertension with superimposed preeclampsia [O11.9]                   Active Insurance as of 2025       Primary Coverage       Payor Plan Insurance Group Employer/Plan Group    [employer] EMPLOYEE [id number]       Payor Plan Address Payor Plan Phone Number Payor Plan Fax Number Effective Dates    [address] [phone]  2024 - None Entered    [facility] [id number]         Subscriber Name Subscriber Birth Date Member ID       JANIYA YBARRA EMY 2000 [id number]                     Emergency Contacts        (Rel.) Home Phone Work Phone Mobile Phone    Brenda Le (Mother) [phone] -- --    Ivan Ybarra (Spouse) -- -- [phone]     EDC 3/1/2025    G-1 P-0     37/2 GESTATIONAL AGE      Amos Ybarra [[id number]]    Labor Events     labor?: No   steroids?: None  Antibiotics during labor?: No  Rupture date/time: 2025 0515  Rupture type: artificial rupture of " "membranes  Fluid color: normal, clear  Fluid odor: None  Labor type: Induced Onset of Labor  Labor allowed to proceed with plans for an attempted vaginal birth?: Yes  Induction: Misoprostol, Oxytocin  First cervical ripening date/time: 2025  Induction indications: Hypertension, Mild Preeclampsia  Augmentation: Artificial rupture of membranes/amniotomy  Augmentation date/time: 2025 0515  Augmentation indications: Ineffective Contraction Pattern, Preeclampsia  Complications: None  Presentation    Presentation: Vertex  Position: Occiput Anterior  Wisdom Delivery    Delivery date/time: 2/10/2025  5:12 AM   Delivery type: Vaginal, Spontaneous   Details: Trial of labor?: Yes        Operative Delivery    Forceps attempted?: No  Vacuum extractor attempted?: No                  Wisdom Assessment    Living status: Living  Infant family band number: [id number]  Apgars   1 Minute: 5 Minute: 10 Minute 15 Minute 20 Minute   Skin Color: 1 1      Heart Rate: 2 2      Reflex Irritability: 2 2      Muscle Tone: 2 2      Respiratory Effort: 2 2      Total: 9 9              Apgars Assigned By: ROBERTO QUISPE RN  Resuscitation    Method: Suctioning, CPAP, Dried , Tactile Stimulation  Suction Details  Suction method: bulb syringe  Airway suction: mouth, nose  Oxygen Details  O2 concentration (%): 20  CPAP pressure: 5, CPAP from 16-19 mins of life  Skin to Skin    Skin to skin initiated date/time: 2/10/2025 0512  Skin to skin with: Mother  Measurements    Weight: 7 lb 1.9 oz 3230 g Length: 20\"   Birth comments: HC 36cm; AGA 75%  Delivery Information    Episiotomy: None  Perineal lacerations: 2nd Repaired: Yes   Surgical or additional est. blood loss (mL): 0  Combined est. blood loss (mL): 0      Yanely Pryor RN   Registered Nurse  Nursing     Plan of Care     Signed     Date of Service: 25  Creation Time: 25     Signed         Goal Outcome Evaluation:  Progress: improving  Outcome Evaluation: " Pt 1-2/70/-2   LR at 125 mL/hr  Patient has rested well during the night. Pitocin currently infusing.  AROM at 0515    All vitals are within normal limits,  Pt encouraged to walk and move around during labor.                 Lila Moser RN   Registered Nurse  Nursing     Plan of Care     Signed     Date of Service: 25  Creation Time: 25     Signed         Goal Outcome Evaluation:   Pt is a , GA 37w1d; IOL due to pre-eclampsia; Pt is currently taking a Pitocin break; Pt is taking Labetalol 100 BID; Pt was AROM'd @ 0515; last cervical exam was 4-5/80/-2; VSS, will continue to monitor.                  Pretty benoit RN   Registered Nurse  Nursing     Plan of Care     Signed     Date of Service: 02/10/25 0811  Creation Time: 02/10/25 0811     Signed         Goal Outcome Evaluation:  Plan of Care Reviewed With: patient  Progress: improving  Outcome Evaluation: , 37w2d, O-, GBS-, NKA, IOL for CHTN with superimposed pre eclampsia, PCR: 430. Patient had a  at 0512, second degree laceration with repair. PP Pitocin adminstered per protocol, IV is currently IID due to completion of pitocin infusion. Patient has voided and new pad applied after cleansing perineum. Patient takes labetalol 100 mg BID which was administered at 0613. FF/ML/UU/Scant bleeding. No complaints at this time outside of cramping with fundal exams. VSS. Patient is to be taken to 2A as long as she remains stable.  Pretty Hollins RN  08:12 CST              Teresa Dave MD   Physician  OB/GYN     ZOHRA Notes     Signed     Date of Service: 25  Creation Time: 25     Signed       Expand All Collapse All     Nicola Napier  :   2000  MRN:   [id number]  CSN:    [id number]     Labor progress note        Subjective  Patient currently reports is having no problems.  Patient denies any pain.  No LOF or VB.  She has now had four doses of cytotec           Objective  Min/max  vitals past 24 hours:  Temp  Min: 98 °F (36.7 °C)  Max: 98.5 °F (36.9 °C)   BP  Min: 118/82  Max: 140/84   Pulse  Min: 77  Max: 104   Resp  Min: 16  Max: 18         FHT's: reactive, reassuring  category 1.    125 + accels, no decels, moderate variability   Cervix: was checked (by me): 0 cm / 70 % / -3.  Soft, mid position   Contractions: occasional                  Assessment  IUP at 37w0d, IOL for preeclampsia  GBS negative  Fetus reassuring  Starting to make cervical change, although still closed it is now soft and thinned 70%           Plan  Switching to low-dose pitocin for overnight  Will attempt AROM in the AM  Patient without questions or concerns.     Teresa Dave MD  2025  20:20 CST                        History & Physical        Kayla Peralta MD at 25 0741          H&P reviewed. The patient was examined and there are no changes to the H&P.    Electronically signed by Kayla Peralta MD at 25 0724   Source Note: H&P (View-Only)          [facility]  Janiya Napier  : 2000  MRN: [id number]  CSN: [id number]    History and Physical    Subjective  Janiya Napier is a 24 y.o. year old  with an Estimated Date of Delivery: 3/1/25 scheduled for induction of labor on 2025 due to  chronic hypertension with superimposed preeclampsia .  Prenatal care has been with Dr. Kayla Peralta.  It has been complicated by chronic hypertension with superimposed pre-eclampsia.    OB History    Para Term  AB Living   1 0 0 0 0 0   SAB IAB Ectopic Molar Multiple Live Births   0 0 0 0 0 0      # Outcome Date GA Lbr Maurisio/2nd Weight Sex Type Anes PTL Lv   1 Current              Past Medical History:   Diagnosis Date    Acute appendicitis 2018    Anxiety     Family history of colon cancer     Family history of colonic polyps     Gastroesophageal reflux disease with esophagitis without hemorrhage 10/17/2022    Increased heartburn and reflux, despite  taking TUMS daily.  Endoscopy 11/2022 showed LA grade B esophagitis.  Pantoprazole 40 mg daily started.     Anti-reflux measures were reviewed and discussed with patient.  They were advised to refrain from chocolate, alcohol, smoking, peppermint and caffeine.  They were advised to limit fatty foods, large meals, and eating late at nighttime.  They were advis    Hypertension     Nausea & vomiting 10/17/2022    Started 9/24/22 associated with diarrhea led to the ER visit as already described.  Diarrhea has resolved nausea vomiting persist.  Endoscopy 11/2022 showed LA B esophagitis.      PONV (postoperative nausea and vomiting)      Past Surgical History:   Procedure Laterality Date    APPENDECTOMY N/A 5/8/2018    Procedure: APPENDECTOMY LAPAROSCOPIC;  Surgeon: Nga Collier MD;  Location: [facility] OR;  Service: General    CHOLECYSTECTOMY WITH INTRAOPERATIVE CHOLANGIOGRAM N/A 8/3/2020    Procedure: CHOLECYSTECTOMY LAPAROSCOPIC INTRAOPERATIVE CHOLANGIOGRAM;  Surgeon: Nga Collier MD;  Location: [facility] OR;  Service: General;  Laterality: N/A;    COLONOSCOPY N/A 11/4/2022    Procedure: COLONOSCOPY WITH ANESTHESIA;  Surgeon: Betsy Hester MD;  Location: [facility] ENDOSCOPY;  Service: Gastroenterology;  Laterality: N/A;  pre: n/v/d/c  post: normal  Starla Mccray APRN    COLPOSCOPY      ENDOSCOPY N/A 11/4/2022    Procedure: ESOPHAGOGASTRODUODENOSCOPY WITH ANESTHESIA;  Surgeon: Betsy Hester MD;  Location: [facility] ENDOSCOPY;  Service: Gastroenterology;  Laterality: N/A;  pre: n/v/d/c  post: esophagitis   Starla Mccray APRN    SHOULDER SURGERY Right     x2    TONSILLECTOMY         Current Outpatient Medications:     aspirin 81 MG EC tablet, Take 1 tablet by mouth Daily., Disp: 30 tablet, Rfl: 11    docusate sodium (COLACE) 50 MG capsule, Take 1 capsule by mouth 2 (Two) Times a Day., Disp: , Rfl:     labetalol (NORMODYNE) 100 MG tablet, Take 1 tablet by mouth 2 (Two) Times a Day., Disp: 60 tablet, Rfl: 2     metoclopramide (Reglan) 10 MG tablet, Take 1 tablet by mouth 4 (Four) Times a Day As Needed (nausea)., Disp: 30 tablet, Rfl: 2    omeprazole OTC (PrilOSEC OTC) 20 MG EC tablet, Take 1 tablet by mouth Daily., Disp: 28 tablet, Rfl: 4    ondansetron ODT (ZOFRAN-ODT) 4 MG disintegrating tablet, Place 1 tablet on the tongue Every 6 (Six) Hours As Needed for Nausea or Vomiting., Disp: 120 tablet, Rfl: 2    polyethylene glycol (MIRALAX) 17 g packet, Take 17 g by mouth Daily., Disp: , Rfl:     prenatal vitamins (PRENATAL 27-1) 27-1 MG tablet tablet, Take 1 tablet by mouth Daily., Disp: 90 tablet, Rfl: 3    senna (SENOKOT) 8.6 MG tablet, Take 1 tablet by mouth Daily., Disp: , Rfl:     No Known Allergies  Social History    Tobacco Use      Smoking status: Never      Smokeless tobacco: Never    Review of Systems      Objective  /88   Wt 125 kg (275 lb)   BMI 41.81 kg/m²   General: well developed; well nourished  no acute distress  mentation appropriate   Heart: regular rate and rhythm, S1, S2 normal, no murmur, click, rub or gallop   Lungs: breathing is unlabored   Abdomen:  Cervix:  Presentation:  EFW by Leopold's:  EFW by recent u/s: soft, non-tender; no masses  no umbilical or inguinal hernias are present  no hepato-splenomegaly  was not checked.  Vertex         Prenatal Labs  Lab Results   Component Value Date    HGB 12.0 02/03/2025    HEPBSAG Negative 07/16/2024    ABO O 07/16/2024    RH Negative 07/16/2024    ABSCRN Negative 07/16/2024    [id number] Non Reactive 07/16/2024    URINECX Final report 08/16/2024       Recent Labs  Lab Results   Component Value Date    HGB 12.0 02/03/2025    HCT 36.0 02/03/2025    WBC 9.89 02/03/2025     02/03/2025           Assessment  IUP with an Estimated Date of Delivery: 3/1/25  Induction of labor scheduled on 2/8/2025 for  chronic HTN with superimposed preeclampsia .  The patient's pelvis feels clinically adequate for IOL to be appropriate, although she understands that this  clinical judgement is not always accurate. EFW today 3406 g with normal TIMBO. Cervix unfavorable therefore will need cytotec for cervical ripening.   Group B Strep status: pending         Plan  Risks and benefits of induction discussed.  Patient understands that IOL increases the risk of  delivery over spontaneous labor, especially if the patient does not have a favorable cervix.  PCN for GBS prophylaxis  Anticipate .     Kayla Peralta MD  2025             Electronically signed by Kayla Peralta MD at 25 1024                 Kayla Peralta MD at 25 0830          [facility]  Janiya Napier  : 2000  MRN: [id number]  CSN: [id number]    History and Physical    Subjective  Janiya Napier is a 24 y.o. year old  with an Estimated Date of Delivery: 3/1/25 scheduled for induction of labor on 2025 due to  chronic hypertension with superimposed preeclampsia .  Prenatal care has been with Dr. Kayla Peralta.  It has been complicated by chronic hypertension with superimposed pre-eclampsia.    OB History    Para Term  AB Living   1 0 0 0 0 0   SAB IAB Ectopic Molar Multiple Live Births   0 0 0 0 0 0      # Outcome Date GA Lbr Maurisio/2nd Weight Sex Type Anes PTL Lv   1 Current              Past Medical History:   Diagnosis Date    Acute appendicitis 2018    Anxiety     Family history of colon cancer     Family history of colonic polyps     Gastroesophageal reflux disease with esophagitis without hemorrhage 10/17/2022    Increased heartburn and reflux, despite taking TUMS daily.  Endoscopy 2022 showed LA grade B esophagitis.  Pantoprazole 40 mg daily started.     Anti-reflux measures were reviewed and discussed with patient.  They were advised to refrain from chocolate, alcohol, smoking, peppermint and caffeine.  They were advised to limit fatty foods, large meals, and eating late at nighttime.  They were advis    Hypertension      Nausea & vomiting 10/17/2022    Started 9/24/22 associated with diarrhea led to the ER visit as already described.  Diarrhea has resolved nausea vomiting persist.  Endoscopy 11/2022 showed LA B esophagitis.      PONV (postoperative nausea and vomiting)      Past Surgical History:   Procedure Laterality Date    APPENDECTOMY N/A 5/8/2018    Procedure: APPENDECTOMY LAPAROSCOPIC;  Surgeon: Nga Collier MD;  Location: [facility] OR;  Service: General    CHOLECYSTECTOMY WITH INTRAOPERATIVE CHOLANGIOGRAM N/A 8/3/2020    Procedure: CHOLECYSTECTOMY LAPAROSCOPIC INTRAOPERATIVE CHOLANGIOGRAM;  Surgeon: Nga Collier MD;  Location: [facility] OR;  Service: General;  Laterality: N/A;    COLONOSCOPY N/A 11/4/2022    Procedure: COLONOSCOPY WITH ANESTHESIA;  Surgeon: Betsy Hester MD;  Location: [facility] ENDOSCOPY;  Service: Gastroenterology;  Laterality: N/A;  pre: n/v/d/c  post: normal  Starla Mccray, APRN    COLPOSCOPY      ENDOSCOPY N/A 11/4/2022    Procedure: ESOPHAGOGASTRODUODENOSCOPY WITH ANESTHESIA;  Surgeon: Betsy Hester MD;  Location: [facility] ENDOSCOPY;  Service: Gastroenterology;  Laterality: N/A;  pre: n/v/d/c  post: esophagitis   Starla Mccray, APRN    SHOULDER SURGERY Right     x2    TONSILLECTOMY         Current Outpatient Medications:     aspirin 81 MG EC tablet, Take 1 tablet by mouth Daily., Disp: 30 tablet, Rfl: 11    docusate sodium (COLACE) 50 MG capsule, Take 1 capsule by mouth 2 (Two) Times a Day., Disp: , Rfl:     labetalol (NORMODYNE) 100 MG tablet, Take 1 tablet by mouth 2 (Two) Times a Day., Disp: 60 tablet, Rfl: 2    metoclopramide (Reglan) 10 MG tablet, Take 1 tablet by mouth 4 (Four) Times a Day As Needed (nausea)., Disp: 30 tablet, Rfl: 2    omeprazole OTC (PrilOSEC OTC) 20 MG EC tablet, Take 1 tablet by mouth Daily., Disp: 28 tablet, Rfl: 4    ondansetron ODT (ZOFRAN-ODT) 4 MG disintegrating tablet, Place 1 tablet on the tongue Every 6 (Six) Hours As Needed for Nausea or Vomiting., Disp:  120 tablet, Rfl: 2    polyethylene glycol (MIRALAX) 17 g packet, Take 17 g by mouth Daily., Disp: , Rfl:     prenatal vitamins (PRENATAL 27-1) 27-1 MG tablet tablet, Take 1 tablet by mouth Daily., Disp: 90 tablet, Rfl: 3    senna (SENOKOT) 8.6 MG tablet, Take 1 tablet by mouth Daily., Disp: , Rfl:     No Known Allergies  Social History    Tobacco Use      Smoking status: Never      Smokeless tobacco: Never    Review of Systems      Objective  /88   Wt 125 kg (275 lb)   BMI 41.81 kg/m²   General: well developed; well nourished  no acute distress  mentation appropriate   Heart: regular rate and rhythm, S1, S2 normal, no murmur, click, rub or gallop   Lungs: breathing is unlabored   Abdomen:  Cervix:  Presentation:  EFW by Leopold's:  EFW by recent u/s: soft, non-tender; no masses  no umbilical or inguinal hernias are present  no hepato-splenomegaly  was not checked.  Vertex         Prenatal Labs  Lab Results   Component Value Date    HGB 12.0 2025    HEPBSAG Negative 2024    ABO O 2024    RH Negative 2024    ABSCRN Negative 2024    [id number] Non Reactive 2024    URINECX Final report 2024       Recent Labs  Lab Results   Component Value Date    HGB 12.0 2025    HCT 36.0 2025    WBC 9.89 2025     2025           Assessment  IUP with an Estimated Date of Delivery: 3/1/25  Induction of labor scheduled on 2025 for  chronic HTN with superimposed preeclampsia .  The patient's pelvis feels clinically adequate for IOL to be appropriate, although she understands that this clinical judgement is not always accurate. EFW today 3406 g with normal TIMBO. Cervix unfavorable therefore will need cytotec for cervical ripening.   Group B Strep status: pending         Plan  Risks and benefits of induction discussed.  Patient understands that IOL increases the risk of  delivery over spontaneous labor, especially if the patient does not have a favorable  cervix.  PCN for GBS prophylaxis  Anticipate .     Kayla Peralta MD  2025             Electronically signed by Kayla Peralta MD at 25 1024          Operative/Procedure Notes (last 72 hours)        Kayla Peralta MD at 02/10/25 0630          [facility]  Vaginal Delivery Note  Review the Delivery Report for details.       Delivery     Delivery: Vaginal, Spontaneous    YOB: 2026   Time of Birth:  Gestational Age 5:12 AM  37w2d     Anesthesia: Epidural    Delivering clinician: Kayla Peralta   Forceps?   No   Vacuum? No    Shoulder dystocia present: No        Delivery narrative:  The patient was noted to be completely dilated and effaced with the fetal head at the introitus. The fetal vertex was delivered OA spontaneously with maternal pushing efforts. No nuchal cord. The left anterior shoulder was delivered atraumatically by maternal expulsive efforts with the assistance of routine downward traction. The posterior shoulder delivered with maternal expulsive efforts and routine upward traction. The remainder of the fetus delivered spontaneously. Upon delivery, the infant was noted to be vigorous and was passed to the mother's abdomen into the care of the awaiting infant care nurse. Following a 60-second delay in cord clamping, the cord was clamped x2 and cut. Cord blood was obtained for analysis. During the third stage of labor, IV Pitocin was administered to enhance uterine contraction. The placenta delivered spontaneously, intact, with a three-vessel cord.The cervix, vagina and perineum were inspected for lacerations. A second degree midline laceration was appreciated and repaired with #3-O caprosyn to reapproximate the laceration in layers. Anesthesia during laceration repair: epidural and 1% lidocaine. Good hemostasis was confirmed. No lacerations were noted to the cervix, perineum, or vulva. The uterine fundus was firm at the end of the procedure. Mom and baby  "tolerated the delivery well. All needle, sponge, and instrument counts were noted to be correct x2 at the end of the procedure.       Infant    Findings: female infant     Infant observations: Weight: 3230 g (7 lb 1.9 oz)  Length: 20 in  Observations/Comments:  HC 36cm; AGA 75%     Apgars: 9  @ 1 minute /    9  @ 5 minutes   Infant Name: Sheila Camara     Placenta, Cord, and Fluid    Placenta delivered  Spontaneous at   2/10/2025  5:14 AM    Cord: 3 vessels present.   Nuchal Cord?  no   Cord blood obtained: Yes   Cord gases obtained:  No   Cord gas results: Venous:  No results found for: \"PHCVEN\", \"BECVEN\"    Arterial:  No results found for: \"PHCART\", \"BECART\"     Repair    Episiotomy: None    No    Lacerations: Yes  Laceration Information  Laceration Repaired?   Perineal: 2nd Yes   Periurethral:       Labial:       Sulcus:       Vaginal:       Cervical:         Suture used for repair: 3-0 caprosyn     Estimated Blood Loss:  200 cc             Quantitative Blood Loss:                  Complications  none    Disposition  Mother to Mother Baby/Postpartum  in stable condition currently.  Baby to remains with mom  in stable condition currently.      Kayla Peralta MD  02/10/25  06:31 CST         Electronically signed by Kayla Peralta MD at 02/10/25 0633          Physician Progress Notes (last 4 days)        Teresa Dave MD at 25 0517          [facility]  Janiya Shepard Karthikeyan  : 2000  MRN: [id number]  CSN: [id number]    Labor progress note    Subjective   Patient currently reports is having no problems.  Patient slept well overnight on low-dose pitocin.  She is only occasionally feeling mild contractions.  No LOF or VB.    Objective   Min/max vitals past 24 hours:  Temp  Min: 98 °F (36.7 °C)  Max: 98.6 °F (37 °C)   BP  Min: 118/82  Max: 143/78   Pulse  Min: 77  Max: 104   Resp  Min: 16  Max: 18        FHT's: reactive, reassuring  120 +accels, no decels, moderate variability   Cervix: was " checked (by me): 1-2 cm / 70 % / -3.  Mid station.  AROM with scant clear fluid   Contractions: Difficult to trace         Assessment   IUP at 37w1d  IOL for preeclampsia: patient starting to make change now.  BP elevated sometimes, but not enough to need prn medications from hypertensive emergency protocol.  Patient still getting labetalol 100 mg bid  GBS negative  Fetus reassuring    Plan   Continue with induction  Start going up on pitocin now, per protocol  AROM now, clear fluid.    Teresa Dave MD  2/9/2025  05:18 CST           Electronically signed by Teresa Dave MD at 02/09/25 2085

## 2025-02-10 NOTE — PLAN OF CARE
Goal Outcome Evaluation:  Plan of Care Reviewed With: patient, spouse           Outcome Evaluation: VSS. Blood pressure stable. FFML U1. Scant lochia. Ambulating, voiding, passing gas. HA and pain tolerated well with meds. Rhogam studies ordered for 2/11. Rubella non-immune and vacc. inforamtion provided. Breastfeeding. Bonding well with infant. Encourage filling out all paperwork. Labetalol 100 mg held at 1800 (BP 98/52 & per pt request). IV IID LFA

## 2025-02-10 NOTE — LACTATION NOTE
"Mother's Name: Janiya Phone #: [phone]  Infant Name: Sheila Camara : 02/10/2026 at 0512  Gestation: 37-2  Day of life: 0  Birth weight:   7-1.9 (3230g)   Discharge weight:  Weight Loss:   24 hour Summary of Feeds:  Voids:  Stools:  Assistive devices (shields, shells, etc): none  Significant Maternal history: , Anxiety, CHTN  Maternal Concerns:    Maternal Goal: \"Try to breastfeed but fine with formula feeding too\"  Mother's Medications: PNV, Miralax, Zofran, Prilosec, Reglan, Labetalol, Colace, ASA  Breastpump for home: Spectra  Ped follow up appt: Dr. Nunez     Visited with pt for initial lactation consult and assistance with first feeding. Infant swaddled and dad holding her upon arrival. With pt's permission, assisted to latch infant to right breast in cross cradle hold. Difficulty obtaining deep latch at first, but after several attempts, infant deeply latched and deep jaw drops observed. Pt has colostrum that is easy to hand express. Pt has short nipples, but infant able to pull nipple into her mouth. Discussed pros and cons of nipple shield- may try for next feed. Infant nursed for 10 minutes, unlatched and uninterested in continuing to feed. Placed skin to skin with mom. Educated about hand expression and that she can used EBM for supplementation as needed. Educated to feed infant q3hr and on demand when infant showing cues. Left lactation's number for her to call for assistance if she wishes. Much encouragement and support provided.     1210  Called to room by pt for assistance with feeding. Mom and baby skin to skin upon arrival. Assisted pt to position infant in football hold on left breast. Attempted to latch infant directly to breast- she seems quite sleepy and will not keep deep latch. Used 16 mm nipple shield and infant latched on well. Infant requires a lot of stimulation to stay awake and sucking. When infant unlatches self, mom does a good job of relatching infant. Recommended that pt hand " express and give infant EBM after each feed. Left pt while still feeding infant, spouse at bedside.       .Breastfeeding and Diaper Chart  Check List for Essentials of Positioning And Latch-on handout provided by [employer]  Hand Expression handout provided by [employer]  Five Keys to Successful Breastfeeding handout provided by [employer]    The Many Benefits if Breastfeeding handout given  Breastfeeding saves time  *Breastfeeding allows you to calm or feed your baby immediately, which leads to a happier baby who cries less  *There is nothing to buy, prepare, or maintain.There is nothing to clean or sterilize.  Breastfeeding builds a mothers confidence  *She knows all her baby needs to thrive is her!  Breastfeeding saves Money  *There is no formula to buy and healthier breast fed babies have less medical costs  Healthy Mom/Healthy baby  * babies get sick less often, and when they do they are usually sick less severely and for a shorter time  * babies have fewer ear infections  * babies have fewer allergies  *Mothers who breastfeed have a lower risk for cancer, osteoporosis, anemia, high blood pressure, obesity, and Type ll diabetes  *Mothers miss less work days with sick babies  Breast fed babies have a better dental health  * babies have better jaw development which requires lest orthodontic work  *Breast milk does not promote cavities  * babies can nurse at night without worry of tooth decay  Breastfeeding allows a baby to reach his full IQ potential  *The longer a baby is breast fed, the better their brain development  Breast fed babies and moms are more relaxed  *The hormones released during breastfeeding have a calming effect on mothers  *Breastfeeding requires mom to take a break; this may help mom get more rest after delivery  *Breastfeeding is quicker than preparing formula which allows mom and baby to get  back to sleep faster  *Breastfeeding promotes bonding and allows mom to learn babies cues and care needs more quickly  Breastfeeding cleanup is easier  *The bowel movements and spit up of breast fed babies doesn't smell as bad  *Spit-up of breast fed babies doesn't stain clothing  Getting out of the hourse is easier  *No formula bottles to prepare and carry safely   *No time restraints due to worry about what baby will eat  *No worries about warming a bottle or finding safe water to prepare bottles  Breastfeeding mother get their bodies back sooner  *The uterus shrinks more quickly and completely, which allows a flatter tummy  *Breastfeeding burns 400-500 calories a day; making milk torches stored fat!  Breastfeeding is better for the environment  *There is no trash to dispose of after breastfeeding  *There is no production facility to produce breast milk; moms body does it all without the pollution of a factory      Your Guide to Breastfeeding Booklet by [employer], www.[employer]      Safe Storage of Breastmilk magnet: [email or web address]

## 2025-02-10 NOTE — ANESTHESIA PROCEDURE NOTES
Labor Epidural      Patient reassessed immediately prior to procedure    Patient location during procedure: OB  Performed By  CRNA/CAA: Juan Jose Snyder CRNA  Preanesthetic Checklist  Completed: patient identified, IV checked, site marked, risks and benefits discussed, surgical consent, monitors and equipment checked, pre-op evaluation and timeout performed  Additional Notes  Very difficult procedure do to pt body habitus  Prep:  Pt Position:sitting  Sterile Tech:gloves and sterile barrier  Prep:chlorhexidine gluconate and isopropyl alcohol  Monitoring:blood pressure monitoring and continuous pulse oximetry  Epidural Block Procedure:  Approach:midline  Guidance:landmark technique and palpation technique  Location:L4-L5  Needle Type:Tuohy  Needle Gauge:18 G (DAWNA at  cm)  Loss of Resistance Medium: saline  Loss of Resistance: 8cm  Cath Depth at skin:14 (Catheter passed without resistance or paresthesia) cm  Paresthesia: none  Aspiration:negative  Test Dose:negative (3ml)  Medication: lidocaine 1.5%-EPINEPHrine 1:200,000 (XYLOCAINE W/EPI) injection - Injection   3 mL - 2/10/2025 4:35:00 AM  Number of Attempts: 2  Post Assessment:  Dressing:occlusive dressing applied and secured with tape  Pt Tolerance:patient tolerated the procedure well with no apparent complications  Complications:no

## 2025-02-10 NOTE — ANESTHESIA PREPROCEDURE EVALUATION
Anesthesia Evaluation     Patient summary reviewed and Nursing notes reviewed   history of anesthetic complications:  PONV               Airway   Mallampati: II  Dental - normal exam     Pulmonary - negative pulmonary ROS and normal exam   Cardiovascular - normal exam    (+) hypertension (Does not take medication when not pregnant)      Neuro/Psych  (+) psychiatric history Anxiety  GI/Hepatic/Renal/Endo    (+) morbid obesity    Musculoskeletal (-) negative ROS    Abdominal    Substance History - negative use     OB/GYN    (+) Pregnant, Preeclampsia, pregnancy induced hypertension    Comment: G1  Currently on Labetolol. Pressures WNL      Other                          Anesthesia Plan    ASA 3     epidural       Anesthetic plan, risks, benefits, and alternatives have been provided, discussed and informed consent has been obtained with: patient and spouse/significant other.        CODE STATUS:    Code Status (Patient has no pulse and is not breathing): CPR (Attempt to Resuscitate)  Medical Interventions (Patient has pulse or is breathing): Full Support

## 2025-02-10 NOTE — PLAN OF CARE
Goal Outcome Evaluation:               Pt is a , GA 37w1d; IOL due to pre-eclampsia; Pt is currently taking a Pitocin break; Pt is taking Labetalol 100 BID; Pt was AROM'd @ 0515; last cervical exam was 4-/-2; VSS, will continue to monitor.

## 2025-02-10 NOTE — ANESTHESIA PROCEDURE NOTES
Labor Epidural      Patient reassessed immediately prior to procedure    Patient location during procedure: OB  Start Time: 2/10/2025 3:02 AM  Stop Time: 2/10/2025 3:06 AM  Performed By  CRNA/CAA: Nannette Hahn CRNA  Preanesthetic Checklist  Completed: patient identified, IV checked, site marked, risks and benefits discussed, surgical consent, monitors and equipment checked, pre-op evaluation and timeout performed  Prep:  Pt Position:sitting  Sterile Tech:gloves, cap, sterile barrier and mask  Prep:chlorhexidine gluconate and isopropyl alcohol  Monitoring:continuous pulse oximetry and blood pressure monitoring  Epidural Block Procedure:  Approach:midline  Guidance:landmark technique  Location:L2-L3  Loss of Resistance Medium: saline  Loss of Resistance: 10cm  Cath Depth at skin:15 cm  Paresthesia: none  Aspiration:negative  Test Dose:negative  Post Assessment:  Dressing:secured with tape and occlusive dressing applied  Pt Tolerance:patient tolerated the procedure well with no apparent complications  Complications:no

## 2025-02-10 NOTE — L&D DELIVERY NOTE
[facility]  Vaginal Delivery Note  Review the Delivery Report for details.       Delivery     Delivery: Vaginal, Spontaneous    YOB: 2026   Time of Birth:  Gestational Age 5:12 AM  37w2d     Anesthesia: Epidural    Delivering clinician: Kayla Peralta   Forceps?   No   Vacuum? No    Shoulder dystocia present: No        Delivery narrative:  The patient was noted to be completely dilated and effaced with the fetal head at the introitus. The fetal vertex was delivered OA spontaneously with maternal pushing efforts. No nuchal cord. The left anterior shoulder was delivered atraumatically by maternal expulsive efforts with the assistance of routine downward traction. The posterior shoulder delivered with maternal expulsive efforts and routine upward traction. The remainder of the fetus delivered spontaneously. Upon delivery, the infant was noted to be vigorous and was passed to the mother's abdomen into the care of the awaiting infant care nurse. Following a 60-second delay in cord clamping, the cord was clamped x2 and cut. Cord blood was obtained for analysis. During the third stage of labor, IV Pitocin was administered to enhance uterine contraction. The placenta delivered spontaneously, intact, with a three-vessel cord.The cervix, vagina and perineum were inspected for lacerations. A second degree midline laceration was appreciated and repaired with #3-O caprosyn to reapproximate the laceration in layers. Anesthesia during laceration repair: epidural and 1% lidocaine. Good hemostasis was confirmed. No lacerations were noted to the cervix, perineum, or vulva. The uterine fundus was firm at the end of the procedure. Mom and baby tolerated the delivery well. All needle, sponge, and instrument counts were noted to be correct x2 at the end of the procedure.       Infant    Findings: female infant     Infant observations: Weight: 3230 g (7 lb 1.9 oz)  Length: 20 in  Observations/Comments:  HC 36cm; AGA  "75%     Apgars: 9  @ 1 minute /    9  @ 5 minutes   Infant Name: Sheila Camara     Placenta, Cord, and Fluid    Placenta delivered  Spontaneous at   2/10/2025  5:14 AM    Cord: 3 vessels present.   Nuchal Cord?  no   Cord blood obtained: Yes   Cord gases obtained:  No   Cord gas results: Venous:  No results found for: \"PHCVEN\", \"BECVEN\"    Arterial:  No results found for: \"PHCART\", \"BECART\"     Repair    Episiotomy: None    No    Lacerations: Yes  Laceration Information  Laceration Repaired?   Perineal: 2nd Yes   Periurethral:       Labial:       Sulcus:       Vaginal:       Cervical:         Suture used for repair: 3-0 caprosyn     Estimated Blood Loss:  200 cc             Quantitative Blood Loss:                  Complications  none    Disposition  Mother to Mother Baby/Postpartum  in stable condition currently.  Baby to remains with mom  in stable condition currently.      Kayla Peralta MD  02/10/25  06:31 CST       "

## 2025-02-10 NOTE — PLAN OF CARE
Goal Outcome Evaluation:  Plan of Care Reviewed With: patient        Progress: improving  Outcome Evaluation: , 37w2d, O-, GBS-, NKA, IOL for CHTN with superimposed pre eclampsia, PCR: 430. Patient had a  at 0512, second degree laceration with repair. PP Pitocin adminstered per protocol, IV is currently IID due to completion of pitocin infusion. Patient has voided and new pad applied after cleansing perineum. Patient takes labetalol 100 mg BID which was administered at 0613. FF/ML/UU/Scant bleeding. No complaints at this time outside of cramping with fundal exams. VSS. Patient is to be taken to 2A as long as she remains stable.  Pretty Hollins RN  08:12 CST

## 2025-02-11 LAB
ABO GROUP BLD: NORMAL
BASOPHILS # BLD AUTO: 0.02 10*3/MM3 (ref 0–0.2)
BASOPHILS NFR BLD AUTO: 0.2 % (ref 0–1.5)
BLD GP AB SCN SERPL QL: NEGATIVE
DEPRECATED RDW RBC AUTO: 44.5 FL (ref 37–54)
EOSINOPHIL # BLD AUTO: 0.14 10*3/MM3 (ref 0–0.4)
EOSINOPHIL NFR BLD AUTO: 1.2 % (ref 0.3–6.2)
ERYTHROCYTE [DISTWIDTH] IN BLOOD BY AUTOMATED COUNT: 14.4 % (ref 12.3–15.4)
FETAL BLEED: NEGATIVE
HCT VFR BLD AUTO: 28.7 % (ref 34–46.6)
HGB BLD-MCNC: 8.9 G/DL (ref 12–15.9)
IMM GRANULOCYTES # BLD AUTO: 0.05 10*3/MM3 (ref 0–0.05)
IMM GRANULOCYTES NFR BLD AUTO: 0.4 % (ref 0–0.5)
LYMPHOCYTES # BLD AUTO: 2.71 10*3/MM3 (ref 0.7–3.1)
LYMPHOCYTES NFR BLD AUTO: 23.5 % (ref 19.6–45.3)
MCH RBC QN AUTO: 26.7 PG (ref 26.6–33)
MCHC RBC AUTO-ENTMCNC: 31 G/DL (ref 31.5–35.7)
MCV RBC AUTO: 86.2 FL (ref 79–97)
MONOCYTES # BLD AUTO: 0.8 10*3/MM3 (ref 0.1–0.9)
MONOCYTES NFR BLD AUTO: 6.9 % (ref 5–12)
NEUTROPHILS NFR BLD AUTO: 67.8 % (ref 42.7–76)
NEUTROPHILS NFR BLD AUTO: 7.81 10*3/MM3 (ref 1.7–7)
NRBC BLD AUTO-RTO: 0 /100 WBC (ref 0–0.2)
NUMBER OF DOSES: NORMAL
PLATELET # BLD AUTO: 173 10*3/MM3 (ref 140–450)
PMV BLD AUTO: 10.7 FL (ref 6–12)
RBC # BLD AUTO: 3.33 10*6/MM3 (ref 3.77–5.28)
RH BLD: NEGATIVE
WBC NRBC COR # BLD AUTO: 11.53 10*3/MM3 (ref 3.4–10.8)

## 2025-02-11 PROCEDURE — 86900 BLOOD TYPING SEROLOGIC ABO: CPT | Performed by: OBSTETRICS & GYNECOLOGY

## 2025-02-11 PROCEDURE — 85025 COMPLETE CBC W/AUTO DIFF WBC: CPT | Performed by: OBSTETRICS & GYNECOLOGY

## 2025-02-11 PROCEDURE — 86901 BLOOD TYPING SEROLOGIC RH(D): CPT | Performed by: OBSTETRICS & GYNECOLOGY

## 2025-02-11 PROCEDURE — 86850 RBC ANTIBODY SCREEN: CPT

## 2025-02-11 PROCEDURE — 85461 HEMOGLOBIN FETAL: CPT | Performed by: OBSTETRICS & GYNECOLOGY

## 2025-02-11 PROCEDURE — 0503F POSTPARTUM CARE VISIT: CPT | Performed by: ADVANCED PRACTICE MIDWIFE

## 2025-02-11 RX ADMIN — ACETAMINOPHEN 650 MG: 325 TABLET ORAL at 20:38

## 2025-02-11 RX ADMIN — PRENATAL VIT W/ FE FUMARATE-FA TAB 27-0.8 MG 1 TABLET: 27-0.8 TAB at 09:33

## 2025-02-11 RX ADMIN — DOCUSATE SODIUM 100 MG: 100 CAPSULE, LIQUID FILLED ORAL at 09:33

## 2025-02-11 RX ADMIN — PRAMOXINE HYDROCHLORIDE AND HYDROCORTISONE ACETATE: 100; 100 AEROSOL, FOAM TOPICAL at 06:37

## 2025-02-11 RX ADMIN — IBUPROFEN 600 MG: 600 TABLET, FILM COATED ORAL at 09:33

## 2025-02-11 RX ADMIN — IBUPROFEN 600 MG: 600 TABLET, FILM COATED ORAL at 18:01

## 2025-02-11 RX ADMIN — HYDROCORTISONE 2.5% 1 APPLICATION: 25 CREAM TOPICAL at 06:37

## 2025-02-11 RX ADMIN — DOCUSATE SODIUM 100 MG: 100 CAPSULE, LIQUID FILLED ORAL at 20:38

## 2025-02-11 RX ADMIN — LABETALOL HYDROCHLORIDE 100 MG: 200 TABLET, FILM COATED ORAL at 08:40

## 2025-02-11 RX ADMIN — IBUPROFEN 600 MG: 600 TABLET, FILM COATED ORAL at 03:40

## 2025-02-11 RX ADMIN — ACETAMINOPHEN 650 MG: 325 TABLET ORAL at 14:01

## 2025-02-11 NOTE — DISCHARGE INSTR - APPOINTMENTS
Appointment with  on February 20th at 10:30 am    Appointment with  on March 25th at 10:45 am     Initial lactation appointment at [facility] 02/13/2025 @ 1:00 pm

## 2025-02-11 NOTE — PROGRESS NOTES
"      JASON Gastelum  [facility]  [address]  [place] [address]  Office [phone]  Fax [phone]    [facility]  Vaginal Delivery Progress Note    Subjective   Postpartum Day 1: Vaginal Delivery    Patient is feeling well and denies concerns this morning.  Has had 1 headache since delivery, but feels the Tylenol resolved the headache.  Denies visual disturbance or epigastric pain.  Has been taking Tylenol and Motrin for discomfort, along with using comfort products.  Ambulating and voiding without difficulty.  Describes bleeding as about like her period.     Breast-feeding, and she reports it has been getting better.  Infant did latch to each breast for 20 minutes this morning.    Objective     Vital Signs Range for the last 24 hours  Temperature: Temp:  [97.6 °F (36.4 °C)-99.6 °F (37.6 °C)] 97.6 °F (36.4 °C)   Temp Source: Temp src: Temporal   BP: BP: ()/(52-64) 110/60   Pulse: Heart Rate:  [] 93   Respirations: Resp:  [16-18] 18   SPO2: SpO2:  [97 %-99 %] 97 %   O2 Amount (l/min):     O2 Devices Device (Oxygen Therapy): room air   Weight:       Admit Height:  Height: 172.7 cm (68\")      Physical Exam:  General:  no acute distresss.  Abdomen: Soft, nontender; fundus firm  Extremities: Movement without difficulty, calves nontender, 2+ patellar reflexes, no clonus, no edema    Lab results reviewed: Yes  Rubella:  No results found for: \"RUBELLAIGGIN\" Nurse Transcribed from prenatal record --  No components found for: \"EXTRUBELQUAL\"  Rh Status:    RH type   Date Value Ref Range Status   02/11/2025 Negative  Final     Immunizations:   Immunization History   Administered Date(s) Administered    ABRYSVO (RSV, 60+ or pregnant women 32-36 wks) 01/24/2025    DTaP 1999, 09/16/2000, 06/12/2003    DTaP / IPV 1999    DTaP, Unspecified 1999, 1999, 09/06/2000    Fluzone  >6mos 10/16/2024    Fluzone (or Fluarix & Flulaval for VFC) >6mos 10/15/2019, 10/14/2020    HPV " Quadrivalent 01/31/2011    Hep B, Adolescent or Pediatric 1999, 06/08/2000    Hepatitis B 2 Dose Vaccine Heplisav-B 04/23/2024    HiB 09/06/2000    Hib (PRP-D) 1999    Hib (PRP-OMP) 1999, 1999    IPV 1999, 1999, 06/08/2000, 06/12/2003    Influenza Seasonal Injectable 11/22/2021, 11/29/2022, 11/13/2023    MMR 09/06/2000, 06/12/2003    Meningococcal MCV4P (Menactra) 07/12/2010, 08/13/2018    Rho (D) Immune Globulin 12/13/2024    Tdap 07/12/2010, 08/13/2018, 07/25/2024    Varicella 06/08/2000, 07/12/2010     Lab Results (last 24 hours)       Procedure Component Value Units Date/Time    CBC & Differential [[id number]]  (Abnormal) Collected: 02/11/25 0556    Specimen: Blood Updated: 02/11/25 0616    Narrative:      The following orders were created for panel order CBC & Differential.  Procedure                               Abnormality         Status                     ---------                               -----------         ------                     CBC Auto Differential[[id number]]        Abnormal            Final result                 Please view results for these tests on the individual orders.    CBC Auto Differential [[id number]]  (Abnormal) Collected: 02/11/25 0556    Specimen: Blood Updated: 02/11/25 0616     WBC 11.53 10*3/mm3      RBC 3.33 10*6/mm3      Hemoglobin 8.9 g/dL      Hematocrit 28.7 %      MCV 86.2 fL      MCH 26.7 pg      MCHC 31.0 g/dL      RDW 14.4 %      RDW-SD 44.5 fl      MPV 10.7 fL      Platelets 173 10*3/mm3      Neutrophil % 67.8 %      Lymphocyte % 23.5 %      Monocyte % 6.9 %      Eosinophil % 1.2 %      Basophil % 0.2 %      Immature Grans % 0.4 %      Neutrophils, Absolute 7.81 10*3/mm3      Lymphocytes, Absolute 2.71 10*3/mm3      Monocytes, Absolute 0.80 10*3/mm3      Eosinophils, Absolute 0.14 10*3/mm3      Basophils, Absolute 0.02 10*3/mm3      Immature Grans, Absolute 0.05 10*3/mm3      nRBC 0.0 /100 WBC             External Prenatal Results        Pregnancy Outside Results - Transcribed From Office Records - See Scanned Records For Details       Test Value Date Time    ABO  O  02/11/25 0556    Rh  Negative  02/11/25 0556    Antibody Screen  Negative  02/11/25 0556       Negative  02/08/25 0635       Negative  07/16/24 1314    Varicella IgG  1,955 index 07/16/24 1314    Rubella  3.74 index 07/16/24 1314    Hgb  8.9 g/dL 02/11/25 0556       10.8 g/dL 02/08/25 0635       12.0 g/dL 02/03/25 1118       12.0 g/dL 01/20/25 1032       11.7 g/dL 12/13/24 0940       14.2 g/dL 07/16/24 1314    Hct  28.7 % 02/11/25 0556       32.7 % 02/08/25 0635       36.0 % 02/03/25 1118       36.4 % 01/20/25 1032       36.0 % 12/13/24 0940       42.3 % 07/16/24 1314    HgB A1c        1h GTT  101 mg/dL 12/13/24 0940    3h GTT Fasting       3h GTT 1 hour       3h GTT 2 hour       3h GTT 3 hour        Gonorrhea (discrete)  Negative  02/03/25 1347       Negative  07/16/24 1314    Chlamydia (discrete)  Negative  02/03/25 1347       Negative  07/16/24 1314    RPR  Non Reactive  12/13/24 0940       Non Reactive  07/16/24 1314    Syphils cascade: TP-Ab (FTA)  Non-Reactive  02/08/25 0635    TP-Ab  Non-Reactive  02/08/25 0635    TP-Ab (EIA)       TPPA       HBsAg  Negative  07/16/24 1314    Herpes Simplex Virus PCR       Herpes Simplex VIrus Culture       HIV  Non Reactive  07/16/24 1314    Hep C RNA Quant PCR       Hep C Antibody       AFP       NIPT       Cystic Fibrosis ([place])  Negative  08/24/24 2046    Cystic Fibroisis        Spinal Muscular atrophy  Negative  08/24/24 2046    Fragile X       Group B Strep  Negative  02/03/25 1347    GBS Susceptibility to Clindamycin       GBS Susceptibility to Erythromycin       Fetal Fibronectin       Genetic Testing, Maternal Blood                 Drug Screening       Test Value Date Time    Urine Drug Screen       Amphetamine Screen       Barbiturate Screen       Benzodiazepine Screen       Methadone Screen       Phencyclidine Screen        Opiates Screen       THC Screen       Cocaine Screen       Propoxyphene Screen       Buprenorphine Screen       Methamphetamine Screen       Oxycodone Screen       Tricyclic Antidepressants Screen                 Legend    ^: Historical                            Assessment & Plan       Chronic hypertension with superimposed preeclampsia      Janiya Napier is Day 1 post-partum  Vaginal, Spontaneous  .      Plan: Continue with current postpartum plan of care.  RhoGAM indicated as infant is Rh+.  Lactation support as needed.  Reviewed postpartum blood pressure warnings to report.      This note has been signed electronically.    Jenise Best DNP, APRN, CNM  2/11/2025  08:01 CST

## 2025-02-11 NOTE — LACTATION NOTE
"Mother's Name: Janiya Phone #: [phone]  Infant Name: Sheila Camara : 02/10/2026 at 0512  Gestation: 37-2  Day of life: 1  Birth weight:   7-1.9 (3230g)   Discharge weight:  Weight Loss: -2.78  24 hour Summary of Feeds: 8 BF Voids: 2 Stools: 4  Assistive devices (shields, shells, etc): 16 mm nipple shield  Significant Maternal history: , Anxiety, CHTN  Maternal Concerns:    Maternal Goal: \"Try to breastfeed but fine with formula feeding too\"  Mother's Medications: PNV, Miralax, Zofran, Prilosec, Reglan, Labetalol, Colace, ASA  Breastpump for home: Spectra  Ped follow up appt: Dr. Nunez     Visited with pt in room for f/u lactation consult. Pt states that feeds are going better with the nipple shield. She states that she has tried to latch without shield each time but has not been successful. Pt states that she has also been hand expressing some and giving infant the EBM. Pt states that she has had much more success with football hold vs cross cradle. Much praise and support provided. Encouraged her to f/u with outpatient lactation.   "

## 2025-02-11 NOTE — PLAN OF CARE
Goal Outcome Evaluation:  Plan of Care Reviewed With: patient        Progress: improving  Outcome Evaluation: VSS, ff ml u1 scant lochia, 2nd degree laceration with  repair and hemorrhoids, breastfeeding, bonding well with infant

## 2025-02-11 NOTE — ANESTHESIA POSTPROCEDURE EVALUATION
Patient: Janiya Napier    Procedure Summary       Date: 02/10/25 Room / Location:     Anesthesia Start: 0258 Anesthesia Stop: 0512    Procedure: LABOR ANALGESIA Diagnosis:     Scheduled Providers:  Provider: Nannette Hahn CRNA    Anesthesia Type: epidural ASA Status: 3            Anesthesia Type: epidural    Vitals  Vitals Value Taken Time   /63 02/11/25 0825   Temp 98 °F (36.7 °C) 02/11/25 0825   Pulse 93 02/11/25 0825   Resp 16 02/11/25 0825   SpO2 98 % 02/11/25 0825           Post Anesthesia Care and Evaluation    Patient location during evaluation: PHASE II  Patient participation: complete - patient participated  Level of consciousness: awake and alert  Pain score: 0  Pain management: adequate    Airway patency: patent  Anesthetic complications: No anesthetic complications  PONV Status: none  Cardiovascular status: acceptable  Respiratory status: acceptable  Hydration status: acceptable  Post Neuraxial Block status: Motor and sensory function returned to baseline and No signs or symptoms of PDPHNo anesthesia care post op

## 2025-02-12 VITALS
HEIGHT: 68 IN | TEMPERATURE: 98 F | RESPIRATION RATE: 16 BRPM | WEIGHT: 281.2 LBS | OXYGEN SATURATION: 99 % | BODY MASS INDEX: 42.62 KG/M2 | DIASTOLIC BLOOD PRESSURE: 74 MMHG | HEART RATE: 96 BPM | SYSTOLIC BLOOD PRESSURE: 126 MMHG

## 2025-02-12 PROCEDURE — 0503F POSTPARTUM CARE VISIT: CPT

## 2025-02-12 PROCEDURE — 25010000002 RHO D IMMUNE GLOBULIN 1500 UNIT/2ML SOLUTION PREFILLED SYRINGE: Performed by: OBSTETRICS & GYNECOLOGY

## 2025-02-12 RX ORDER — FERROUS SULFATE 325(65) MG
325 TABLET ORAL
Status: DISCONTINUED | OUTPATIENT
Start: 2025-02-12 | End: 2025-02-12 | Stop reason: HOSPADM

## 2025-02-12 RX ORDER — PSEUDOEPHEDRINE HCL 30 MG
100 TABLET ORAL 2 TIMES DAILY PRN
Qty: 60 CAPSULE | Refills: 0 | Status: SHIPPED | OUTPATIENT
Start: 2025-02-12 | End: 2025-02-20

## 2025-02-12 RX ADMIN — FERROUS SULFATE TAB 325 MG (65 MG ELEMENTAL FE) 325 MG: 325 (65 FE) TAB at 08:23

## 2025-02-12 RX ADMIN — IBUPROFEN 600 MG: 600 TABLET, FILM COATED ORAL at 02:24

## 2025-02-12 RX ADMIN — PRENATAL VIT W/ FE FUMARATE-FA TAB 27-0.8 MG 1 TABLET: 27-0.8 TAB at 08:23

## 2025-02-12 RX ADMIN — HUMAN RHO(D) IMMUNE GLOBULIN 1500 UNITS: 1500 SOLUTION INTRAMUSCULAR; INTRAVENOUS at 06:51

## 2025-02-12 RX ADMIN — ACETAMINOPHEN 650 MG: 325 TABLET ORAL at 06:51

## 2025-02-12 RX ADMIN — DOCUSATE SODIUM 100 MG: 100 CAPSULE, LIQUID FILLED ORAL at 08:23

## 2025-02-12 NOTE — NURSING NOTE
RN educated patient on discharge teaching. Patient stated no further questions at this time. Patient had no IV access this shift.

## 2025-02-12 NOTE — PROGRESS NOTES
"      Margarita Madrid CNM  [facility]  [address]  [place] [address]  Office [phone]  Fax [phone]    [facility]  Vaginal Delivery Progress Note    Subjective   Postpartum Day 2: Vaginal Delivery    The patient feels well.  Her pain is well controlled with nonsteroidal anti-inflammatory drugs and Tylenol.   She is ambulating well.  Patient describes her bleeding as thin lochia.    Breastfeeding: infant latching without difficulty with pain.    Objective     Vital Signs Range for the last 24 hours  Temperature: Temp:  [98.1 °F (36.7 °C)-98.6 °F (37 °C)] 98.6 °F (37 °C)   Temp Source: Temp src: Temporal   BP: BP: (102-126)/(47-70) 102/47   Pulse: Heart Rate:  [88-97] 88   Respirations: Resp:  [16-18] 18   SPO2: SpO2:  [98 %] 98 %   O2 Amount (l/min):     O2 Devices Device (Oxygen Therapy): room air   Weight:       Admit Height:  Height: 172.7 cm (68\")      Physical Exam:  General:  no acute distresss.  Abdomen: abdomen is soft without significant tenderness, masses, organomegaly or guarding. Fundus: appropriate, firm, non tender  Extremities: normal, atraumatic, no cyanosis, and trace edema.   Nipple tenderness but she is using lanolin cream and denies skin breakdown or blisters.    Lab results reviewed:  Yes   Rubella:  No results found for: \"RUBELLAIGGIN\" Nurse Transcribed from prenatal record --  No components found for: \"EXTRUBELQUAL\"  Rh Status:    RH type   Date Value Ref Range Status   02/11/2025 Negative  Final     Immunizations:   Immunization History   Administered Date(s) Administered    ABRYSVO (RSV, 60+ or pregnant women 32-36 wks) 01/24/2025    DTaP 1999, 09/16/2000, 06/12/2003    DTaP / IPV 1999    DTaP, Unspecified 1999, 1999, 09/06/2000    Fluzone  >6mos 10/16/2024    Fluzone (or Fluarix & Flulaval for VFC) >6mos 10/15/2019, 10/14/2020    HPV Quadrivalent 01/31/2011    Hep B, Adolescent or Pediatric 1999, 06/08/2000    Hepatitis B 2 Dose Vaccine " Heplisav-B 04/23/2024    HiB 09/06/2000    Hib (PRP-D) 1999    Hib (PRP-OMP) 1999, 1999    IPV 1999, 1999, 06/08/2000, 06/12/2003    Influenza Seasonal Injectable 11/22/2021, 11/29/2022, 11/13/2023    MMR 09/06/2000, 06/12/2003    Meningococcal MCV4P (Menactra) 07/12/2010, 08/13/2018    Rho (D) Immune Globulin 12/13/2024, 02/12/2025    Tdap 07/12/2010, 08/13/2018, 07/25/2024    Varicella 06/08/2000, 07/12/2010     Lab Results (last 24 hours)       ** No results found for the last 24 hours. **            External Prenatal Results       Pregnancy Outside Results - Transcribed From Office Records - See Scanned Records For Details       Test Value Date Time    ABO  O  02/11/25 0556    Rh  Negative  02/11/25 0556    Antibody Screen  Negative  02/11/25 0556       Negative  02/08/25 0635       Negative  07/16/24 1314    Varicella IgG  1,955 index 07/16/24 1314    Rubella  3.74 index 07/16/24 1314    Hgb  8.9 g/dL 02/11/25 0556       10.8 g/dL 02/08/25 0635       12.0 g/dL 02/03/25 1118       12.0 g/dL 01/20/25 1032       11.7 g/dL 12/13/24 0940       14.2 g/dL 07/16/24 1314    Hct  28.7 % 02/11/25 0556       32.7 % 02/08/25 0635       36.0 % 02/03/25 1118       36.4 % 01/20/25 1032       36.0 % 12/13/24 0940       42.3 % 07/16/24 1314    HgB A1c        1h GTT  101 mg/dL 12/13/24 0940    3h GTT Fasting       3h GTT 1 hour       3h GTT 2 hour       3h GTT 3 hour        Gonorrhea (discrete)  Negative  02/03/25 1347       Negative  07/16/24 1314    Chlamydia (discrete)  Negative  02/03/25 1347       Negative  07/16/24 1314    RPR  Non Reactive  12/13/24 0940       Non Reactive  07/16/24 1314    Syphils cascade: TP-Ab (FTA)  Non-Reactive  02/08/25 0635    TP-Ab  Non-Reactive  02/08/25 0635    TP-Ab (EIA)       TPPA       HBsAg  Negative  07/16/24 1314    Herpes Simplex Virus PCR       Herpes Simplex VIrus Culture       HIV  Non Reactive  07/16/24 1314    Hep C RNA Quant PCR       Hep C Antibody        AFP       NIPT       Cystic Fibrosis (Prosper)  Negative  08/24/24 2046    Cystic Fibroisis        Spinal Muscular atrophy  Negative  08/24/24 2046    Fragile X       Group B Strep  Negative  02/03/25 1347    GBS Susceptibility to Clindamycin       GBS Susceptibility to Erythromycin       Fetal Fibronectin       Genetic Testing, Maternal Blood                 Drug Screening       Test Value Date Time    Urine Drug Screen       Amphetamine Screen       Barbiturate Screen       Benzodiazepine Screen       Methadone Screen       Phencyclidine Screen       Opiates Screen       THC Screen       Cocaine Screen       Propoxyphene Screen       Buprenorphine Screen       Methamphetamine Screen       Oxycodone Screen       Tricyclic Antidepressants Screen                 Legend    ^: Historical                            Assessment & Plan       Chronic hypertension with superimposed preeclampsia      Janiya Napier is Day 2  post-partum  Vaginal, Spontaneous  .      Plan:  Discharge home with standard precautions and return to clinic in 4-6 weeks.      This note has been signed electronically.    Margarita GOMEZ CNM  2/12/2025  08:26 CST

## 2025-02-12 NOTE — LACTATION NOTE
"Mother's Name: Janiya Phone #: [phone]  Infant Name: Sheila Camara : 02/10/2026 at 0512  Gestation: 37-2  Day of life: 2  Birth weight:   7-1.9 (3230g)   Discharge weight:  6-8.9% (2975 g)  Weight Loss: -7.89%  24 hour Summary of Feeds: 7 BF Voids: 3 Stools: 4  Assistive devices (shields, shells, etc): 16 mm nipple shield  Significant Maternal history: , Anxiety, CHTN  Maternal Concerns: Weight loss  Maternal Goal: \"Try to breastfeed but fine with formula feeding too\"  Mother's Medications: PNV, Miralax, Zofran, Prilosec, Reglan, Labetalol, Colace, ASA  Breastpump for home: Spectra  Ped follow up appt: Dr. Nunez     F/U with mom for discharge teaching.  Mom denies any soreness or problems.  She is using a nipple shield at this time d/t latching difficulty.  Voiced concern about 7% weight loss.  Educated on normal wt loss and milk transition.  Encouraged mom to f/u with outpatient lactation to monitor wt loss and get an assessment of milk transfer as her milk transitions, as well as attempting to wean off NS.  Gave Breastfeeding after discharge handouts and normal feeding amounts for a  baby and reviewed with mom.  Questions answered.    Signs of Milk: Fullness, firmness, heaviness of breasts, leaking of milk.  Signs of Good Feed: Breast fullness prior to feed, breasts soft and comfortable after feeding. Infant content after feeding: calm, sleepy, relaxed and without continued hunger cues.  Signs of Plugged Ducts, Engorgement and Mastitis: Plugged ducts (milk entrapment in milk ducts)- small tender knots that often feel like little beans under breast tissue, usually tender. Massage on these areas of concern while breastfeeding or pumping to promote emptying.   Engorgement- fluid or excess milk, breasts become uncomfortably full, tight, firm (compare to the firmness of your cheek (mild), chin (moderate) or forehead (severe). First line of treatment should be to BREASTFEED, if breasts remain full " feeling after a feeding, it may be necessary to pump, ONLY UNTIL BREASTS ARE SOFT AND COMFORTABLE. DO NOT OVER PUMP (complete emptying of breasts can trigger even more milk which will cause continued, recurrent Engorgement).  Mastitis- Infection of the breast tissue, most often caused by plugged ducts that are not adequately treated by emptying, recurrent trauma to nipples or breasts (cracked or bleeding nipples). Signs: redness, swelling, tender knots or fever to breasts as well as generalized fever >101 degrees F that is often sudden onset. Treatment of mastitis, BREASTFEED! Pump after breastfeeding to achieve COMPLETE emptying of affected breast, utilizing massage to areas of concern, may use cold compress to affected area only after breast emptying. May take anti-inflammatories i.e. Ibuprofen, Motrin. CALL your OB for assessment and continued treatment with Antibiotics to adequately treat mastitis.  Infant Care: Over the first 2 weeks it is important to keep record of infant's feeding routine (feeding times and durations), wet and dirty diaper frequency, stool color and any spit ups that may occur.  Keep in mind, ALL babies will lose some weight initially (usually no more than 10% by day 3). Until infant returns to/ surpasses birth weight (which can take up to 2 weeks), it is important to offer feedings AT LEAST EVERY 3 HOURS. Remember, if you choose to supplement infant with formula or previously pumped milk, you should always pump in replacement of that feeding in order to promote and maintain a healthy milk supply!  Maternal Care: REST, sleep when the infant sleeps, stay hydrated (water is optimal) drink to thirst, increase caloric intake - breastfeeding mother's need an ADDITIONAL 500 calories per day , eat 3 meals/day as well as snacks in between, limit CAFFIENE intake to 2 cups/day. Ask your significant other, family members or friends for help when needed, taking advantage of meal trains, allowing others  to help with laundry, house chores, etc can help you focus on what is most important early on after delivery… you and your infant, and breastfeeding!   Medications to CONTINUE: Prenatal Vitamins are important to continue taking while breastfeeding. Fish oil, magnesium/calcium supplements often are helpful to support Mothers and their milk supply as well. Tylenol, Ibuprofen, regular Zyrtec, Claritin are SAFE if you suffer from seasonal allergies. Flonase is safe and often an effective medication to take if suffering from sinus drainage/pressure.  Medications to AVOID: Benadryl, Sudafed, any medications including “DM” or have a drying effect to sinus drainage will also dry a mother's milk up. Birth control- your OB will want to address birth control options with you usually around 4-6 weeks postpartum, be sure to notify your MD if you continue to breastfeed as some birth controls may significantly decrease your milk supply. Herbals- some herbs may also decrease your milk supply: PEPPERMINT, MENTHOL in any form (candies, essential oils, teas, etc), so check labels and avoid using in excess.  Pumping: Although we encourage you to focus on breastfeeding over the first 2-4 weeks, you will need to plan to begin pumping. We do recommend implementing pumping by the time infant is 4 weeks old. Pump 2-3 times per day immediately AFTER breastfeeding, it is normal to collect very small amounts initially, but the more consistently you pump, the more you will begin to collect. Store collected milk in refrigerator or freezer. You should also begin offering infant a bottle around 4 weeks. Remember to use slow flow nipples and PACE the bottle-feed. A bottle feed should take about as long as a breastfeeding session.

## 2025-02-12 NOTE — PLAN OF CARE
Goal Outcome Evaluation:              Outcome Evaluation: VSS. FFMLU1. Scant lochia. Prn tylenol and motrin rotated for pain. 2nd degree lac with repair. Comfort products in use. Breastfeeding with nipple shield. Bonding well with infant.

## 2025-02-12 NOTE — DISCHARGE SUMMARY
[facility]    Margarita Madrid RUPA  [address]  [place] [address]  [phone]      Discharge Summary     Nicola Napier  : 2000  MRN: [id number]  CSN: [id number]    Date of Admission: 2025   Date of Discharge:  2025   Delivering Physician: Kayla Peralta       Admission Diagnosis: Chronic hypertension with superimposed preeclampsia [O11.9]   Discharge Diagnosis: Pregnancy at 37w2d - delivered       Procedures: 2/10/2025 - Vaginal, Spontaneous      Hospital Course  Patient is a 24 y.o.  who at 37w2d had a vaginal birth.  Her postpartum course was without complications.  On PPD #2 she was ready for discharge.  She had normal lochia and pain well controlled with oral medications.    Infant  female fetus weighing 3230 g (7 lb 1.9 oz)  Apgars -  9 @ 1 minute /  9 @ 5 minutes.    Discharge labs  Lab Results   Component Value Date    WBC 11.53 (H) 2025    HGB 8.9 (L) 2025    HCT 28.7 (L) 2025     2025       Discharge Medications     Discharge Medications        Changes to Medications        Instructions Start Date   docusate sodium 100 MG capsule  What changed:   medication strength  how much to take  when to take this  reasons to take this   100 mg, Oral, 2 Times Daily PRN             Continue These Medications        Instructions Start Date   polyethylene glycol 17 g packet  Commonly known as: MIRALAX   17 g, Daily      prenatal vitamins 27-1 MG tablet tablet   1 tablet, Oral, Daily      senna 8.6 MG tablet  Commonly known as: SENOKOT   1 tablet, Daily             Stop These Medications      aspirin 81 MG EC tablet     labetalol 100 MG tablet  Commonly known as: NORMODYNE     metoclopramide 10 MG tablet  Commonly known as: Reglan     omeprazole OTC 20 MG EC tablet  Commonly known as: PrilOSEC OTC     ondansetron ODT 4 MG disintegrating tablet  Commonly known as: ZOFRAN-ODT              External Prenatal  Results       Pregnancy Outside Results - Transcribed From Office Records - See Scanned Records For Details       Test Value Date Time    ABO  O  02/11/25 0556    Rh  Negative  02/11/25 0556    Antibody Screen  Negative  02/11/25 0556       Negative  02/08/25 0635       Negative  07/16/24 1314    Varicella IgG  1,955 index 07/16/24 1314    Rubella  3.74 index 07/16/24 1314    Hgb  8.9 g/dL 02/11/25 0556       10.8 g/dL 02/08/25 0635       12.0 g/dL 02/03/25 1118       12.0 g/dL 01/20/25 1032       11.7 g/dL 12/13/24 0940       14.2 g/dL 07/16/24 1314    Hct  28.7 % 02/11/25 0556       32.7 % 02/08/25 0635       36.0 % 02/03/25 1118       36.4 % 01/20/25 1032       36.0 % 12/13/24 0940       42.3 % 07/16/24 1314    HgB A1c        1h GTT  101 mg/dL 12/13/24 0940    3h GTT Fasting       3h GTT 1 hour       3h GTT 2 hour       3h GTT 3 hour        Gonorrhea (discrete)  Negative  02/03/25 1347       Negative  07/16/24 1314    Chlamydia (discrete)  Negative  02/03/25 1347       Negative  07/16/24 1314    RPR  Non Reactive  12/13/24 0940       Non Reactive  07/16/24 1314    Syphils cascade: TP-Ab (FTA)  Non-Reactive  02/08/25 0635    TP-Ab  Non-Reactive  02/08/25 0635    TP-Ab (EIA)       TPPA       HBsAg  Negative  07/16/24 1314    Herpes Simplex Virus PCR       Herpes Simplex VIrus Culture       HIV  Non Reactive  07/16/24 1314    Hep C RNA Quant PCR       Hep C Antibody       AFP       NIPT       Cystic Fibrosis ([place])  Negative  08/24/24 2046    Cystic Fibroisis        Spinal Muscular atrophy  Negative  08/24/24 2046    Fragile X       Group B Strep  Negative  02/03/25 1347    GBS Susceptibility to Clindamycin       GBS Susceptibility to Erythromycin       Fetal Fibronectin       Genetic Testing, Maternal Blood                 Drug Screening       Test Value Date Time    Urine Drug Screen       Amphetamine Screen       Barbiturate Screen       Benzodiazepine Screen       Methadone Screen       Phencyclidine Screen        Opiates Screen       THC Screen       Cocaine Screen       Propoxyphene Screen       Buprenorphine Screen       Methamphetamine Screen       Oxycodone Screen       Tricyclic Antidepressants Screen                 Legend    ^: Historical                            Discharge Disposition Home or Self Care   Condition on Discharge: good   Follow-up: 1 week with  Margarita Madrid CNM for BP check & at 6 weeks with Dr Peralta       Plan for discharge reviewed with Dr. Dave.    This note has been signed electronically.    JASON Marks CNM  2/12/2025

## 2025-02-12 NOTE — PLAN OF CARE
Goal Outcome Evaluation:  Plan of Care Reviewed With: patient        Progress: improving  Outcome Evaluation: VSS, ff ml u1 scant lochia, voiding and ambulating without difficulty, breastfeeding, 2nd degree lac with repair and hemorrhoids - comfort products at bedside, bonding well with infant, rhogam given

## 2025-02-13 ENCOUNTER — TELEPHONE (OUTPATIENT)
Dept: LACTATION | Facility: HOSPITAL | Age: 25
End: 2025-02-13
Payer: COMMERCIAL

## 2025-02-13 ENCOUNTER — HOSPITAL ENCOUNTER (OUTPATIENT)
Dept: LACTATION | Facility: HOSPITAL | Age: 25
Discharge: HOME | End: 2025-02-13
Payer: COMMERCIAL

## 2025-02-13 ENCOUNTER — MATERNAL SCREENING (OUTPATIENT)
Dept: CALL CENTER | Facility: HOSPITAL | Age: 25
End: 2025-02-13
Payer: COMMERCIAL

## 2025-02-13 NOTE — OUTREACH NOTE
Maternal Screening Survey      Flowsheet Row Responses   Eligibility Eligible   Prep survey completed? Yes   Facility patient discharged from? [facility] - Registered Nurse

## 2025-02-13 NOTE — LACTATION NOTE
Pt cancelled lactation appt- phone consult instead. Pt states that she has chosen not to breastfeed and would prefer to feed formula. She states that she prefers seeing the amt that infant is eating. Much encouragement given that this is okay and that if it makes her feel better, then that is what matters.   Given suppression education as follows-   Wear tighter fitting sports bra at all times  Avoid any nipple/breast stimulation   Apply cold compress for 15 mins several times per day  Avoid heat  Turn back to water in shower   Eat peppermints  Much encouragement and support provided. Advised to call back if she has any difficulty or questions.

## 2025-02-13 NOTE — PAYOR COMM NOTE
"CT HOME 25  FAX APPROVAL  [phone]    Janiya Ybarra (24 y.o. Female)       Date of Birth   2000    Social Security Number       Address   [address] [id number]    Home Phone   [phone]    MRN   [id number]       [facility]    Marital Status                               Admission Date   25    Admission Type   Elective    Admitting Provider   Kayla Peralta MD    Attending Provider       Department, Room/Bed   [facility] MOTHER BABY 2A, M240/1       Discharge Date   2025    Discharge Disposition   Home or Self Care    Discharge Destination   Home                              Attending Provider: (none)   Allergies: No Known Allergies    Isolation: None   Infection: None   Code Status: Prior    Ht: 172.7 cm (68\")   Wt: 128 kg (281 lb 3.2 oz)    Admission Cmt: None   Principal Problem: Chronic hypertension with superimposed preeclampsia [O11.9]                   Active Insurance as of 2025       Primary Coverage       Payor Plan Insurance Group Employer/Plan Group    [employer] [id number]       Payor Plan Address Payor Plan Phone Number Payor Plan Fax Number Effective Dates    [address] [phone]  2024 - None Entered    [place] [address]         Subscriber Name Subscriber Birth Date Member ID       JANIYA YBARRA 2000 [id number]                     Emergency Contacts        (Rel.) Home Phone Work Phone Mobile Phone    Brenda Le (Mother) [phone] -- --    KarthikeyanIvan (Spouse) -- -- [phone]                 Discharge Summary        Margarita Madrid CNM at 25 0831          [facility]    Margarita Madrid CNM  [address]  [place] [address]  [phone]      Discharge Summary    [facility]  Janiya Ybarra  : 2000  MRN: [id number]  CSN: [id number]    Date of Admission: 2025   Date " of Discharge:  2025   Delivering Physician: Kayla Peralta       Admission Diagnosis: Chronic hypertension with superimposed preeclampsia [O11.9]   Discharge Diagnosis: Pregnancy at 37w2d - delivered       Procedures: 2/10/2025 - Vaginal, Spontaneous      Hospital Course  Patient is a 24 y.o.  who at 37w2d had a vaginal birth.  Her postpartum course was without complications.  On PPD #2 she was ready for discharge.  She had normal lochia and pain well controlled with oral medications.    Infant  female fetus weighing 3230 g (7 lb 1.9 oz)  Apgars -  9 @ 1 minute /  9 @ 5 minutes.    Discharge labs  Lab Results   Component Value Date    WBC 11.53 (H) 2025    HGB 8.9 (L) 2025    HCT 28.7 (L) 2025     2025       Discharge Medications     Discharge Medications        Changes to Medications        Instructions Start Date   docusate sodium 100 MG capsule  What changed:   medication strength  how much to take  when to take this  reasons to take this   100 mg, Oral, 2 Times Daily PRN             Continue These Medications        Instructions Start Date   polyethylene glycol 17 g packet  Commonly known as: MIRALAX   17 g, Daily      prenatal vitamins 27-1 MG tablet tablet   1 tablet, Oral, Daily      senna 8.6 MG tablet  Commonly known as: SENOKOT   1 tablet, Daily             Stop These Medications      aspirin 81 MG EC tablet     labetalol 100 MG tablet  Commonly known as: NORMODYNE     metoclopramide 10 MG tablet  Commonly known as: Reglan     omeprazole OTC 20 MG EC tablet  Commonly known as: PrilOSEC OTC     ondansetron ODT 4 MG disintegrating tablet  Commonly known as: ZOFRAN-ODT              External Prenatal Results       Pregnancy Outside Results - Transcribed From Office Records - See Scanned Records For Details       Test Value Date Time    ABO  O  25    Rh  Negative  25    Antibody Screen  Negative  2556       Negative  25 0635        Negative  07/16/24 1314    Varicella IgG  1,955 index 07/16/24 1314    Rubella  3.74 index 07/16/24 1314    Hgb  8.9 g/dL 02/11/25 0556       10.8 g/dL 02/08/25 0635       12.0 g/dL 02/03/25 1118       12.0 g/dL 01/20/25 1032       11.7 g/dL 12/13/24 0940       14.2 g/dL 07/16/24 1314    Hct  28.7 % 02/11/25 0556       32.7 % 02/08/25 0635       36.0 % 02/03/25 1118       36.4 % 01/20/25 1032       36.0 % 12/13/24 0940       42.3 % 07/16/24 1314    HgB A1c        1h GTT  101 mg/dL 12/13/24 0940    3h GTT Fasting       3h GTT 1 hour       3h GTT 2 hour       3h GTT 3 hour        Gonorrhea (discrete)  Negative  02/03/25 1347       Negative  07/16/24 1314    Chlamydia (discrete)  Negative  02/03/25 1347       Negative  07/16/24 1314    RPR  Non Reactive  12/13/24 0940       Non Reactive  07/16/24 1314    Syphils cascade: TP-Ab (FTA)  Non-Reactive  02/08/25 0635    TP-Ab  Non-Reactive  02/08/25 0635    TP-Ab (EIA)       TPPA       HBsAg  Negative  07/16/24 1314    Herpes Simplex Virus PCR       Herpes Simplex VIrus Culture       HIV  Non Reactive  07/16/24 1314    Hep C RNA Quant PCR       Hep C Antibody       AFP       NIPT       Cystic Fibrosis ([place])  Negative  08/24/24 2046    Cystic Fibroisis        Spinal Muscular atrophy  Negative  08/24/24 2046    Fragile X       Group B Strep  Negative  02/03/25 1347    GBS Susceptibility to Clindamycin       GBS Susceptibility to Erythromycin       Fetal Fibronectin       Genetic Testing, Maternal Blood                 Drug Screening       Test Value Date Time    Urine Drug Screen       Amphetamine Screen       Barbiturate Screen       Benzodiazepine Screen       Methadone Screen       Phencyclidine Screen       Opiates Screen       THC Screen       Cocaine Screen       Propoxyphene Screen       Buprenorphine Screen       Methamphetamine Screen       Oxycodone Screen       Tricyclic Antidepressants Screen                 Legend    ^: Historical                             Discharge Disposition Home or Self Care   Condition on Discharge: good   Follow-up: 1 week with  Margarita Madrid CNM for BP check & at 6 weeks with Dr Peralta       Plan for discharge reviewed with Dr. Dave.    This note has been signed electronically.    JASON Marks CNM  2/12/2025       Electronically signed by Margarita Madrid CNM at 02/12/25 0802

## 2025-02-20 ENCOUNTER — POSTPARTUM VISIT (OUTPATIENT)
Age: 25
End: 2025-02-20
Payer: COMMERCIAL

## 2025-02-20 VITALS
DIASTOLIC BLOOD PRESSURE: 88 MMHG | SYSTOLIC BLOOD PRESSURE: 132 MMHG | WEIGHT: 255 LBS | BODY MASS INDEX: 38.65 KG/M2 | HEIGHT: 68 IN

## 2025-02-20 DIAGNOSIS — Z30.011 VISIT FOR ORAL CONTRACEPTIVE PRESCRIPTION: ICD-10-CM

## 2025-02-20 RX ORDER — NORGESTIMATE AND ETHINYL ESTRADIOL 7DAYSX3 LO
1 KIT ORAL DAILY
Qty: 28 TABLET | Refills: 12 | Status: SHIPPED | OUTPATIENT
Start: 2025-02-20 | End: 2026-02-20

## 2025-02-20 NOTE — PROGRESS NOTES
"Chief Complaint  Postpartum Care (Pt here for 2 wk PP visit, vaginal delivery 2-10-25, girl, currently bottle feeding, PPD screening 6. Pt c/o hemorrhoids, been using prep-H, denies any other problems today.)    Subjective        Janiya Napier presents to [facility] for 2 week pp visit. Required 37 week induction due to mild preeclampsia. BP normal today. Denies PIH symptoms.  VB scant.  Baby doing well. Is now bottle feeding. Desires OCPs.  Has external hemorrhoid that worsens with BM.  Using Otc meds.   History of Present Illness    Objective   Vital Signs:  /88   Ht 172.7 cm (67.99\")   Wt 116 kg (255 lb)   BMI 38.78 kg/m²   Estimated body mass index is 38.78 kg/m² as calculated from the following:    Height as of this encounter: 172.7 cm (67.99\").    Weight as of this encounter: 116 kg (255 lb).          Physical Exam   deferred  Result Review :                Assessment and Plan   Diagnoses and all orders for this visit:    1. Postpartum care following vaginal delivery (Primary)  25 y/o CF s/p , doing well. RTC 4 weeks.   2. Visit for oral contraceptive prescription  Start OCPs with first menses  Other orders  -     Norgestimate-Eth Estradiol (Ortho Tri-Cyclen Lo) 0.18/0.215/0.25 MG-25 MCG tablet; Take 1 tablet by mouth Daily.  Dispense: 28 tablet; Refill: 12             Follow Up   Return in about 4 weeks (around 3/20/2025).  Patient was given instructions and counseling regarding her condition or for health maintenance advice. Please see specific information pulled into the AVS if appropriate.             "

## 2025-02-21 ENCOUNTER — MATERNAL SCREENING (OUTPATIENT)
Dept: CALL CENTER | Facility: HOSPITAL | Age: 25
End: 2025-02-21
Payer: COMMERCIAL

## 2025-02-21 NOTE — OUTREACH NOTE
Maternal Screening Survey      Flowsheet Row Responses   Facility patient discharged from? [facility]   Attempt successful? Yes   Call start time 1244   Call end time 1246   I have been able to laugh and see the funny side of things. 0   I have looked forward with enjoyment to things. 1   I have blamed myself unnecessarily when things went wrong. 1   I have been anxious or worried for no good reason. 2   I have felt scared or panicky for no good reason. 0   Things have been getting on top of me. 0   I have been so unhappy that I have had difficulty sleeping. 0   I have felt sad or miserable. 1   I have been so unhappy that I have been crying. 1   The thought of harming myself has occurred to me. 0   [place]  Depression Scale Total 6   Did any of your parents have problems with alcohol or drug use? No   Do any of your peers have problems with alcohol or drug use? No   Does your partner have problems with alcohol or drug use? No   Before you were pregnant did you have problems with alcohol or drug use? (past) No   In the past month, did you drink beer, wine, liquor or use any other drugs? (pregnancy) No   Maternal Screening call completed Yes   Wrap up additional comments took at OBGYN yesterday, no changes   Call end time 1246              TIMMY HODGES - Registered Nurse

## 2025-03-25 ENCOUNTER — POSTPARTUM VISIT (OUTPATIENT)
Age: 25
End: 2025-03-25
Payer: COMMERCIAL

## 2025-03-25 VITALS
BODY MASS INDEX: 39.34 KG/M2 | WEIGHT: 259.6 LBS | SYSTOLIC BLOOD PRESSURE: 132 MMHG | DIASTOLIC BLOOD PRESSURE: 82 MMHG | HEIGHT: 68 IN

## 2025-03-25 PROCEDURE — 0503F POSTPARTUM CARE VISIT: CPT | Performed by: OBSTETRICS & GYNECOLOGY

## 2025-03-25 NOTE — PROGRESS NOTES
"Chief Complaint  Postpartum Care (Pt here for 6 wk PP visit, vaginal delivery 2-10-25, girl, currently bottle feeding, PPD screening 5, last pap 2024, normal. Pt still waiting to start ocp, hasn't started period.)    Subjective        Janiya Napier presents to [facility] for postpartum visit after  on 2/10. Pt doing well. Has not had menses to start OCP.  Baby doing well. Bottle feeding. Last pap smear 2024 WNL.  No complaints. Denies VB, vaginal discharge, pain. Normal bowel and bladder function.   History of Present Illness    Objective   Vital Signs:  /82   Ht 172.7 cm (67.99\")   Wt 118 kg (259 lb 9.6 oz)   BMI 39.48 kg/m²   Estimated body mass index is 39.48 kg/m² as calculated from the following:    Height as of this encounter: 172.7 cm (67.99\").    Weight as of this encounter: 118 kg (259 lb 9.6 oz).          Physical Exam   Normal external genitalia, vaginal mucosa well healed, no suture or granulation tissue, cervix 2x2 cm smooth, no lesions  Result Review :                Assessment and Plan   Diagnoses and all orders for this visit:    1. Postpartum care following vaginal delivery (Primary)  Postpartum S/P , doing well. Start OCPs with menses. Resume normal activities. RTC yearly.            Follow Up   No follow-ups on file.  Patient was given instructions and counseling regarding her condition or for health maintenance advice. Please see specific information pulled into the AVS if appropriate.             "

## (undated) DEVICE — 3M™ STERI-STRIP™ REINFORCED ADHESIVE SKIN CLOSURES, R1546, 1/4 IN X 4 IN (6 MM X 100 MM), 10 STRIPS/ENVELOPE: Brand: 3M™ STERI-STRIP™

## (undated) DEVICE — ENDOPATH PNEUMONEEDLE INSUFFLATION NEEDLES WITH LUER LOCK CONNECTORS 120MM: Brand: ENDOPATH

## (undated) DEVICE — LAPAROSCOPIC MONOPOLAR CORD: Brand: VALLEYLAB

## (undated) DEVICE — PAD LAP CHOLE: Brand: MEDLINE INDUSTRIES, INC.

## (undated) DEVICE — MONOPOLAR METZENBAUM SCISSOR, MINI BLADE TIP, DISPOSABLE: Brand: MONOPOLAR METZENBAUM SCISSOR, MINI BLADE TIP, DISPOSABLE

## (undated) DEVICE — PK TURNOVER RM ADV

## (undated) DEVICE — ENDOPATH XCEL WITH OPTIVIEW TECHNOLOGY DILATING TIP TROCARS WITH STABILITY SLEEVES: Brand: ENDOPATH XCEL OPTIVIEW

## (undated) DEVICE — GLV SURG BIOGEL M LTX PF 7 1/2

## (undated) DEVICE — TOWEL,OR,DSP,ST,BLUE,STD,4/PK,20PK/CS: Brand: MEDLINE

## (undated) DEVICE — ENDOPATH XCEL DILATING TIP TROCARS WITH STABILITY SLEEVES: Brand: ENDOPATH XCEL

## (undated) DEVICE — ANTIBACTERIAL UNDYED BRAIDED (POLYGLACTIN 910), SYNTHETIC ABSORBABLE SUTURE: Brand: COATED VICRYL

## (undated) DEVICE — CUFF,BP,DISP,1 TUBE,ADULT,HP: Brand: MEDLINE

## (undated) DEVICE — CLTH CLENS READYCLEANSE PERI CARE PK/5

## (undated) DEVICE — 2, DISPOSABLE SUCTION/IRRIGATOR WITHOUT DISPOSABLE TIP: Brand: STRYKEFLOW

## (undated) DEVICE — APPL CHLORAPREP W/TINT 26ML ORNG

## (undated) DEVICE — PAD GRND REM POLYHESIVE A/ DISP

## (undated) DEVICE — TOTAL TRAY, 16FR 10ML SIL FOLEY, URN: Brand: MEDLINE

## (undated) DEVICE — Device: Brand: DEFENDO AIR/WATER/SUCTION AND BIOPSY VALVE

## (undated) DEVICE — MASK,OXYGEN,MED CONC,ADLT,7' TUB, UC: Brand: PENDING

## (undated) DEVICE — ENDOPATH XCEL WITH OPTIVIEW TECHNOLOGY UNIVERSAL TROCAR STABILITY SLEEVES: Brand: ENDOPATH XCEL OPTIVIEW

## (undated) DEVICE — SENSR O2 OXIMAX FNGR A/ 18IN NONSTR

## (undated) DEVICE — ENDOPATH ETS-FLEX45 ARTICULATING ENDOSCOPIC LINEAR CUTTER, NO RELOAD: Brand: ENDOPATH

## (undated) DEVICE — ST TBG PNEUMOCLEAR EVAC SMOKE HIFLO

## (undated) DEVICE — SUT VIC 0 UR6 27IN VCP603H

## (undated) DEVICE — ENDOPOUCH RETRIEVER SPECIMEN RETRIEVAL BAGS: Brand: ENDOPOUCH RETRIEVER

## (undated) DEVICE — TBG SMPL FLTR LINE NASL 02/C02 A/ BX/100

## (undated) DEVICE — CONMED SCOPE SAVER BITE BLOCK, 20X27 MM: Brand: SCOPE SAVER

## (undated) DEVICE — THE CHANNEL CLEANING BRUSH IS A NYLON FLEXI BRUSH ATTACHED TO A FLEXIBLE PLASTIC SHEATH DESIGNED TO SAFELY REMOVE DEBRIS FROM FLEXIBLE ENDOSCOPES.

## (undated) DEVICE — APPL CHLORAPREP HI/LITE 26ML ORNG

## (undated) DEVICE — YANKAUER,BULB TIP WITH VENT: Brand: ARGYLE

## (undated) DEVICE — PDS II VLT 0 107CM AG ST3: Brand: ENDOLOOP